# Patient Record
Sex: MALE | Race: WHITE | NOT HISPANIC OR LATINO | Employment: OTHER | URBAN - METROPOLITAN AREA
[De-identification: names, ages, dates, MRNs, and addresses within clinical notes are randomized per-mention and may not be internally consistent; named-entity substitution may affect disease eponyms.]

---

## 2019-09-16 ENCOUNTER — TELEPHONE (OUTPATIENT)
Dept: FAMILY MEDICINE | Facility: CLINIC | Age: 69
End: 2019-09-16

## 2019-09-16 NOTE — TELEPHONE ENCOUNTER
----- Message from Dottie Hatch sent at 9/16/2019 11:40 AM CDT -----  vm- pt left voicemail to call and I called back and no answer  [phone]

## 2019-12-03 ENCOUNTER — OFFICE VISIT (OUTPATIENT)
Dept: FAMILY MEDICINE | Facility: CLINIC | Age: 69
End: 2019-12-03

## 2019-12-03 VITALS
SYSTOLIC BLOOD PRESSURE: 110 MMHG | BODY MASS INDEX: 31.94 KG/M2 | DIASTOLIC BLOOD PRESSURE: 70 MMHG | HEIGHT: 72 IN | HEART RATE: 48 BPM | WEIGHT: 235.81 LBS

## 2019-12-03 DIAGNOSIS — I10 ESSENTIAL HYPERTENSION: ICD-10-CM

## 2019-12-03 DIAGNOSIS — K21.9 GASTROESOPHAGEAL REFLUX DISEASE, ESOPHAGITIS PRESENCE NOT SPECIFIED: ICD-10-CM

## 2019-12-03 DIAGNOSIS — E03.9 HYPOTHYROIDISM, UNSPECIFIED TYPE: Primary | ICD-10-CM

## 2019-12-03 DIAGNOSIS — E78.5 HYPERLIPIDEMIA, UNSPECIFIED HYPERLIPIDEMIA TYPE: ICD-10-CM

## 2019-12-03 DIAGNOSIS — M1A.9XX0 GOUT, CHRONIC: ICD-10-CM

## 2019-12-03 DIAGNOSIS — F41.9 ANXIETY DISORDER, UNSPECIFIED TYPE: ICD-10-CM

## 2019-12-03 PROCEDURE — 1126F PR PAIN SEVERITY QUANTIFIED, NO PAIN PRESENT: ICD-10-PCS | Mod: S$GLB,,, | Performed by: PHYSICIAN ASSISTANT

## 2019-12-03 PROCEDURE — 1159F MED LIST DOCD IN RCRD: CPT | Mod: S$GLB,,, | Performed by: PHYSICIAN ASSISTANT

## 2019-12-03 PROCEDURE — 1126F AMNT PAIN NOTED NONE PRSNT: CPT | Mod: S$GLB,,, | Performed by: PHYSICIAN ASSISTANT

## 2019-12-03 PROCEDURE — 99214 PR OFFICE/OUTPT VISIT, EST, LEVL IV, 30-39 MIN: ICD-10-PCS | Mod: S$GLB,,, | Performed by: PHYSICIAN ASSISTANT

## 2019-12-03 PROCEDURE — 99214 OFFICE O/P EST MOD 30 MIN: CPT | Mod: S$GLB,,, | Performed by: PHYSICIAN ASSISTANT

## 2019-12-03 PROCEDURE — 1159F PR MEDICATION LIST DOCUMENTED IN MEDICAL RECORD: ICD-10-PCS | Mod: S$GLB,,, | Performed by: PHYSICIAN ASSISTANT

## 2019-12-03 RX ORDER — NEBIVOLOL 10 MG/1
20 TABLET ORAL DAILY
Qty: 180 TABLET | Refills: 1 | Status: SHIPPED | OUTPATIENT
Start: 2019-12-03 | End: 2020-06-09 | Stop reason: SDUPTHER

## 2019-12-03 RX ORDER — ATORVASTATIN CALCIUM 20 MG/1
20 TABLET, FILM COATED ORAL DAILY
Qty: 90 TABLET | Refills: 1 | Status: SHIPPED | OUTPATIENT
Start: 2019-12-03 | End: 2020-06-09 | Stop reason: SDUPTHER

## 2019-12-03 RX ORDER — OMEPRAZOLE 20 MG/1
20 CAPSULE, DELAYED RELEASE ORAL DAILY
COMMUNITY
End: 2019-12-03 | Stop reason: SDUPTHER

## 2019-12-03 RX ORDER — AMLODIPINE BESYLATE 10 MG/1
10 TABLET ORAL DAILY
COMMUNITY
Start: 2016-09-13 | End: 2019-12-03 | Stop reason: SDUPTHER

## 2019-12-03 RX ORDER — TERAZOSIN 5 MG/1
5 CAPSULE ORAL NIGHTLY
Qty: 90 CAPSULE | Refills: 1 | Status: SHIPPED | OUTPATIENT
Start: 2019-12-03 | End: 2020-06-09 | Stop reason: SDUPTHER

## 2019-12-03 RX ORDER — OLMESARTAN MEDOXOMIL 40 MG/1
40 TABLET ORAL DAILY
COMMUNITY
End: 2019-12-03 | Stop reason: SDUPTHER

## 2019-12-03 RX ORDER — LEVOTHYROXINE SODIUM 50 UG/1
50 TABLET ORAL
Qty: 90 TABLET | Refills: 1 | Status: SHIPPED | OUTPATIENT
Start: 2019-12-03 | End: 2020-05-31

## 2019-12-03 RX ORDER — ALLOPURINOL 300 MG/1
300 TABLET ORAL DAILY
Qty: 90 TABLET | Refills: 1 | Status: SHIPPED | OUTPATIENT
Start: 2019-12-03 | End: 2020-05-15 | Stop reason: SDUPTHER

## 2019-12-03 RX ORDER — OMEPRAZOLE 20 MG/1
20 CAPSULE, DELAYED RELEASE ORAL DAILY
Qty: 90 CAPSULE | Refills: 1 | Status: SHIPPED | OUTPATIENT
Start: 2019-12-03 | End: 2020-06-09 | Stop reason: SDUPTHER

## 2019-12-03 RX ORDER — LEVOTHYROXINE SODIUM 50 UG/1
50 TABLET ORAL
COMMUNITY
Start: 2018-03-02 | End: 2019-12-03 | Stop reason: SDUPTHER

## 2019-12-03 RX ORDER — NEBIVOLOL 10 MG/1
20 TABLET ORAL DAILY
COMMUNITY
End: 2019-12-03 | Stop reason: SDUPTHER

## 2019-12-03 RX ORDER — LEVOTHYROXINE SODIUM 200 UG/1
200 TABLET ORAL DAILY
COMMUNITY
Start: 2017-03-15 | End: 2020-06-09 | Stop reason: SDUPTHER

## 2019-12-03 RX ORDER — OLMESARTAN MEDOXOMIL AND HYDROCHLOROTHIAZIDE 40/12.5 40; 12.5 MG/1; MG/1
40 TABLET ORAL DAILY
COMMUNITY
End: 2019-12-03

## 2019-12-03 RX ORDER — AMLODIPINE BESYLATE 10 MG/1
10 TABLET ORAL DAILY
Qty: 90 TABLET | Refills: 1 | Status: SHIPPED | OUTPATIENT
Start: 2019-12-03 | End: 2020-06-09 | Stop reason: SDUPTHER

## 2019-12-03 RX ORDER — OLMESARTAN MEDOXOMIL 40 MG/1
40 TABLET ORAL DAILY
Qty: 90 TABLET | Refills: 1 | Status: SHIPPED | OUTPATIENT
Start: 2019-12-03 | End: 2020-09-08 | Stop reason: SDUPTHER

## 2019-12-03 NOTE — PROGRESS NOTES
SUBJECTIVE:    Patient ID: Richy Hdz is a 68 y.o. male.    Chief Complaint: Follow-up (Pt presents today for 5 month follow up and medication refills.....mlr) and Medication Refill (Medication bottles present and visit and meds reconciled.....mlr)    Patient presents today for regular checkup.  He reports that he has been doing well overall.  He is managed for his blood pressure, cholesterol, thyroid, and gout. Planning wedding wedding for 10/10/20. He denies any chest pains or SOB at this time. Had all of his annual labs done earlier this year. Says that he has recently started weight loss regemin. Really watching his diet and portions.      No visits with results within 6 Month(s) from this visit.   Latest known visit with results is:   No results found for any previous visit.       Past Medical History:   Diagnosis Date    Back pain     Cancer     skin-ear    Gout attack     Hyperlipemia     Hypertension     Thyroid disease      Past Surgical History:   Procedure Laterality Date    JOINT REPLACEMENT      bilateral shoulder    kidney stones      SKIN CANCER EXCISION  2013    x 2 to ear.    TONSILLECTOMY      VASECTOMY       History reviewed. No pertinent family history.    Marital Status:   Alcohol History:  reports that he drinks about 7.0 standard drinks of alcohol per week.  Tobacco History:  reports that he has been smoking cigars. He has smoked for the past 0.00 years. He has never used smokeless tobacco.  Drug History:  has no drug history on file.    Review of patient's allergies indicates:  No Known Allergies    Current Outpatient Medications:     allopurinol (ZYLOPRIM) 300 MG tablet, Take 1 tablet (300 mg total) by mouth once daily., Disp: 90 tablet, Rfl: 1    amLODIPine (NORVASC) 10 MG tablet, Take 1 tablet (10 mg total) by mouth once daily., Disp: 90 tablet, Rfl: 1    atorvastatin (LIPITOR) 20 MG tablet, Take 1 tablet (20 mg total) by mouth once daily., Disp: 90  "tablet, Rfl: 1    levothyroxine (SYNTHROID) 200 MCG tablet, Take 200 mcg by mouth once daily., Disp: , Rfl:     levothyroxine (SYNTHROID) 50 MCG tablet, Take 1 tablet (50 mcg total) by mouth before breakfast., Disp: 90 tablet, Rfl: 1    mv with min-lycopene-lutein (CENTRUM SILVER) 0.4-300-250 mg-mcg-mcg Tab, Take 1 tablet by mouth once daily., Disp: , Rfl:     nebivolol (BYSTOLIC) 10 MG Tab, Take 2 tablets (20 mg total) by mouth once daily., Disp: 180 tablet, Rfl: 1    olmesartan (BENICAR) 40 MG tablet, Take 1 tablet (40 mg total) by mouth once daily., Disp: 90 tablet, Rfl: 1    omeprazole (PRILOSEC) 20 MG capsule, Take 1 capsule (20 mg total) by mouth once daily., Disp: 90 capsule, Rfl: 1    terazosin (HYTRIN) 5 MG capsule, Take 1 capsule (5 mg total) by mouth every evening., Disp: 90 capsule, Rfl: 1    Review of Systems   Constitutional: Negative for activity change, fatigue, fever and unexpected weight change.   HENT: Negative for congestion.    Respiratory: Negative for apnea, cough, chest tightness and shortness of breath.    Cardiovascular: Negative for chest pain and palpitations.   Gastrointestinal: Negative for abdominal distention and abdominal pain.   Genitourinary: Negative for difficulty urinating and dysuria.   Musculoskeletal: Negative for arthralgias and back pain.   Neurological: Negative for dizziness and weakness.          Objective:      Vitals:    12/03/19 1137   BP: 110/70   Pulse: (!) 48   Weight: 107 kg (235 lb 12.8 oz)   Height: 5' 11.5" (1.816 m)     Physical Exam   Constitutional: He is oriented to person, place, and time. He appears well-developed and well-nourished. No distress.   HENT:   Head: Normocephalic and atraumatic.   Eyes: Pupils are equal, round, and reactive to light.   Neck: Normal range of motion. Neck supple. No thyromegaly present.   Cardiovascular: Normal rate, regular rhythm, normal heart sounds and intact distal pulses.   Pulmonary/Chest: Effort normal and breath " sounds normal.   Abdominal: Soft. Bowel sounds are normal. He exhibits no distension. There is no tenderness.   Musculoskeletal: Normal range of motion.   Neurological: He is alert and oriented to person, place, and time. No cranial nerve deficit.   Skin: Skin is warm and dry. No rash noted. No erythema.         Assessment:       1. Hypothyroidism, unspecified type    2. Gout, chronic    3. Hyperlipidemia, unspecified hyperlipidemia type    4. Essential hypertension    5. Anxiety disorder, unspecified type    6. Gastroesophageal reflux disease, esophagitis presence not specified         Plan:       Hypothyroidism, unspecified type  Comments:  managed with 250mcg. continue as is.  Orders:  -     levothyroxine (SYNTHROID) 50 MCG tablet; Take 1 tablet (50 mcg total) by mouth before breakfast.  Dispense: 90 tablet; Refill: 1    Gout, chronic  Comments:  Doing well. no recent flares with the current dose of allopurinol  Orders:  -     allopurinol (ZYLOPRIM) 300 MG tablet; Take 1 tablet (300 mg total) by mouth once daily.  Dispense: 90 tablet; Refill: 1    Hyperlipidemia, unspecified hyperlipidemia type  Comments:  stable, continue as is. Really watching the diet and adding exercise.  Orders:  -     atorvastatin (LIPITOR) 20 MG tablet; Take 1 tablet (20 mg total) by mouth once daily.  Dispense: 90 tablet; Refill: 1    Essential hypertension  Comments:  beautiful pressure. Continue as is.  Orders:  -     amLODIPine (NORVASC) 10 MG tablet; Take 1 tablet (10 mg total) by mouth once daily.  Dispense: 90 tablet; Refill: 1  -     nebivolol (BYSTOLIC) 10 MG Tab; Take 2 tablets (20 mg total) by mouth once daily.  Dispense: 180 tablet; Refill: 1  -     olmesartan (BENICAR) 40 MG tablet; Take 1 tablet (40 mg total) by mouth once daily.  Dispense: 90 tablet; Refill: 1  -     terazosin (HYTRIN) 5 MG capsule; Take 1 capsule (5 mg total) by mouth every evening.  Dispense: 90 capsule; Refill: 1    Anxiety disorder, unspecified  type    Gastroesophageal reflux disease, esophagitis presence not specified  Comments:  well managed with prilosec. continue as is.  Orders:  -     omeprazole (PRILOSEC) 20 MG capsule; Take 1 capsule (20 mg total) by mouth once daily.  Dispense: 90 capsule; Refill: 1      Follow up in about 6 months (around 6/3/2020) for Annual Physical.        12/3/2019 Adolfo Mae PA-C

## 2019-12-03 NOTE — PATIENT INSTRUCTIONS
Controlling High Blood Pressure  High blood pressure (hypertension) is often called the silent killer. This is because many people who have it dont know it. High blood pressure is defined as 140/90 mm Hg or higher. Know your blood pressure and remember to check it regularly. Doing so can save your life. Here are some things you can do to help control your blood pressure.    Choose heart-healthy foods  · Select low-salt, low-fat foods. Limit sodium intake to 2,400 mg per day or the amount suggested by your healthcare provider.  · Limit canned, dried, cured, packaged, and fast foods. These can contain a lot of salt.  · Eat 8 to 10 servings of fruits and vegetables every day.  · Choose lean meats, fish, or chicken.  · Eat whole-grain pasta, brown rice, and beans.  · Eat 2 to 3 servings of low-fat or fat-free dairy products.  · Ask your doctor about the DASH eating plan. This plan helps reduce blood pressure.  · When you go to a restaurant, ask that your meal be prepared with no added salt.  Maintain a healthy weight  · Ask your healthcare provider how many calories to eat a day. Then stick to that number.  · Ask your healthcare provider what weight range is healthiest for you. If you are overweight, a weight loss of only 3% to 5% of your body weight can help lower blood pressure. Generally, a good weight loss goal is to lose 10% of your body weight in a year.  · Limit snacks and sweets.  · Get regular exercise.  Get up and get active  · Choose activities you enjoy. Find ones you can do with friends or family. This includes bicycling, dancing, walking, and jogging.  · Park farther away from building entrances.  · Use stairs instead of the elevator.  · When you can, walk or bike instead of driving.  · [place] leaves, garden, or do household repairs.  · Be active at a moderate to vigorous level of physical activity for at least 40 minutes for a minimum of 3 to 4 days a week.   Manage stress  · Make time to relax and enjoy  life. Find time to laugh.  · Communicate your concerns with your loved ones and your healthcare provider.  · Visit with family and friends, and keep up with hobbies.  Limit alcohol and quit smoking  · Men should have no more than 2 drinks per day.  · Women should have no more than 1 drink per day.  · Talk with your healthcare provider about quitting smoking. Smoking significantly increases your risk for heart disease and stroke. Ask your healthcare provider about community smoking cessation programs and other options.  Medicines  If lifestyle changes arent enough, your healthcare provider may prescribe high blood pressure medicine. Take all medicines as prescribed. If you have any questions about your medicines, ask your healthcare provider before stopping or changing them.   Date Last Reviewed: 4/27/2016  © 5486-0974 [employer]. [address] [place] [address], [place] [address]. All rights reserved. This information is not intended as a substitute for professional medical care. Always follow your healthcare professional's instructions.

## 2020-05-15 DIAGNOSIS — M1A.9XX0 GOUT, CHRONIC: ICD-10-CM

## 2020-05-15 RX ORDER — ALLOPURINOL 300 MG/1
300 TABLET ORAL DAILY
Qty: 90 TABLET | Refills: 1 | Status: SHIPPED | OUTPATIENT
Start: 2020-05-15 | End: 2020-06-09 | Stop reason: SDUPTHER

## 2020-05-15 NOTE — TELEPHONE ENCOUNTER
----- Message from Fely Lakhani sent at 5/15/2020 11:28 AM CDT -----  [facility] rep called on behalf of the pt for refill on Allopurinol tablet to [facility] mail order

## 2020-06-09 ENCOUNTER — OFFICE VISIT (OUTPATIENT)
Dept: FAMILY MEDICINE | Facility: CLINIC | Age: 70
End: 2020-06-09
Payer: COMMERCIAL

## 2020-06-09 VITALS
SYSTOLIC BLOOD PRESSURE: 180 MMHG | WEIGHT: 225 LBS | HEIGHT: 72 IN | TEMPERATURE: 99 F | HEART RATE: 56 BPM | BODY MASS INDEX: 30.48 KG/M2 | DIASTOLIC BLOOD PRESSURE: 88 MMHG

## 2020-06-09 DIAGNOSIS — I10 ESSENTIAL HYPERTENSION: ICD-10-CM

## 2020-06-09 DIAGNOSIS — Z79.899 ENCOUNTER FOR LONG-TERM (CURRENT) USE OF OTHER MEDICATIONS: ICD-10-CM

## 2020-06-09 DIAGNOSIS — M1A.9XX0 GOUT, CHRONIC: ICD-10-CM

## 2020-06-09 DIAGNOSIS — E78.5 HYPERLIPIDEMIA, UNSPECIFIED HYPERLIPIDEMIA TYPE: ICD-10-CM

## 2020-06-09 DIAGNOSIS — E03.9 HYPOTHYROIDISM, UNSPECIFIED TYPE: Primary | ICD-10-CM

## 2020-06-09 DIAGNOSIS — K21.9 GASTROESOPHAGEAL REFLUX DISEASE, ESOPHAGITIS PRESENCE NOT SPECIFIED: ICD-10-CM

## 2020-06-09 PROCEDURE — 99214 OFFICE O/P EST MOD 30 MIN: CPT | Mod: S$GLB,,, | Performed by: PHYSICIAN ASSISTANT

## 2020-06-09 PROCEDURE — 99214 PR OFFICE/OUTPT VISIT, EST, LEVL IV, 30-39 MIN: ICD-10-PCS | Mod: S$GLB,,, | Performed by: PHYSICIAN ASSISTANT

## 2020-06-09 RX ORDER — NEBIVOLOL 10 MG/1
20 TABLET ORAL DAILY
Qty: 180 TABLET | Refills: 1 | Status: SHIPPED | OUTPATIENT
Start: 2020-06-09 | End: 2020-09-08 | Stop reason: SDUPTHER

## 2020-06-09 RX ORDER — LEVOTHYROXINE SODIUM 50 UG/1
50 TABLET ORAL
Qty: 90 TABLET | Refills: 1 | Status: SHIPPED | OUTPATIENT
Start: 2020-06-09 | End: 2020-09-08 | Stop reason: SDUPTHER

## 2020-06-09 RX ORDER — LEVOTHYROXINE SODIUM 200 UG/1
200 TABLET ORAL DAILY
Qty: 90 TABLET | Refills: 1 | Status: SHIPPED | OUTPATIENT
Start: 2020-06-09 | End: 2020-09-08 | Stop reason: SDUPTHER

## 2020-06-09 RX ORDER — OMEPRAZOLE 20 MG/1
20 CAPSULE, DELAYED RELEASE ORAL DAILY
Qty: 90 CAPSULE | Refills: 1 | Status: SHIPPED | OUTPATIENT
Start: 2020-06-09 | End: 2020-09-08 | Stop reason: SDUPTHER

## 2020-06-09 RX ORDER — ALLOPURINOL 300 MG/1
300 TABLET ORAL DAILY
Qty: 90 TABLET | Refills: 1 | Status: SHIPPED | OUTPATIENT
Start: 2020-06-09 | End: 2020-06-26 | Stop reason: SDUPTHER

## 2020-06-09 RX ORDER — LEVOTHYROXINE SODIUM 50 UG/1
50 TABLET ORAL
COMMUNITY
End: 2020-06-09 | Stop reason: SDUPTHER

## 2020-06-09 RX ORDER — ATORVASTATIN CALCIUM 20 MG/1
20 TABLET, FILM COATED ORAL DAILY
Qty: 90 TABLET | Refills: 1 | Status: SHIPPED | OUTPATIENT
Start: 2020-06-09 | End: 2020-09-08 | Stop reason: SDUPTHER

## 2020-06-09 RX ORDER — AMLODIPINE BESYLATE 10 MG/1
10 TABLET ORAL DAILY
Qty: 90 TABLET | Refills: 1 | Status: SHIPPED | OUTPATIENT
Start: 2020-06-09 | End: 2020-09-08 | Stop reason: SDUPTHER

## 2020-06-09 RX ORDER — TERAZOSIN 5 MG/1
5 CAPSULE ORAL NIGHTLY
Qty: 90 CAPSULE | Refills: 1 | Status: SHIPPED | OUTPATIENT
Start: 2020-06-09 | End: 2020-09-08 | Stop reason: SDUPTHER

## 2020-06-09 NOTE — PATIENT INSTRUCTIONS
Uncontrolled High Blood Pressure (Established)    Your blood pressure was unusually high today. This can occur if youve missed doses of your blood pressure medicine. Or it can happen if you are taking other medicines. These include some asthma inhalers, decongestants, diet pills, and street drugs like cocaine and amphetamine.  Other causes include:  · Weight gain  · More salt in your diet  · Smoking  · Caffeine  Your blood pressure can also rise if you are emotionally upset or in intense pain. It may go back to normal after a period of rest.  A blood pressure reading is made up of 2 numbers. There is a top number over a bottom number. The top number is the systolic pressure. The bottom number is the diastolic pressure. A normal blood pressure is a systolic pressure of less than 120 over a diastolic pressure of less than 80. High blood pressure (hypertension) is when the top number is 140 or higher. Or it is when the bottom number is 90 or higher. You will see your blood pressure readings written together. For example, a person with a systolic pressure of 118 and a diastolic pressure of 78 will have 118/78 written in the medical record. To be high blood pressure, the numbers must be higher when tested over a period of time. The blood pressures between normal and hypertension are called prehypertension. Prehypertension is a warning sign. The information gives you a chance to make lifestyle changes (weight loss, more exercise) that can keep your blood pressure from going higher.  Home care  Its important to take steps to lower your blood pressure. If you are taking blood pressure medicine, the guidelines below may help you need less or no medicines in the future.  · Begin a weight-loss program if you are overweight.  · Cut back on the amount of salt in your diet:  ¨ Avoid high-salt foods like olives, pickles, smoked meats, and salted potato chips.  ¨ Dont add salt to your food at the table.  ¨ Use only small  amounts of salt when cooking.  · Begin an exercise program. Talk with your health care provider about what exercise program is best for you. It doesnt have to be difficult. Even brisk walking for 20 minutes 3 times a week is a good form of exercise.  · Avoid medicines that stimulates the heart. This includes many over-the-counter cold and sinus decongestant pills and sprays, as well as diet pills. Check the warnings about hypertension on the label. Before purchasing any over-the-counter medicines or supplements, always ask the pharmacist about the product's potential interaction with your high blood pressure and your medicines.  · Stimulants such as amphetamine or cocaine could be lethal for someone with hypertension. Never take these.  · Limit how much caffeine you drink. Or switch to noncaffeinated beverages.  · Stop smoking. If you are a long-time smoker, this can be hard. Enroll in a stop-smoking program to make it more likely that you will succeed. Talk with your provider about ways to quit.  · Learn how to handle stress better. This is an important part of any program to lower blood pressure. Learn ways to relax. These include meditation, yoga, and biofeedback.  · If medicines were prescribed, take them exactly as directed. Missing doses may cause your blood pressure to get out of control.  · If you miss a dose or doses of your medicines, check with your healthcare provider or pharmacist about what to do.  · Consider buying an automatic blood pressure machine. Your provider may recommend a certain type. You can get one of these at most pharmacies. Measure your blood pressure twice a day, in the morning, and in the late afternoon. Keep a written record of your home blood pressure readings and take the record to your medical appointments.  Here are some additional guidelines on home blood pressure monitoring from the American [employer] Association.  · Don't smoke or drink coffee for 30 minutes  · Go to the bathroom  before the test.  · Relax for 5 minutes before taking the measurement.  · Sit correctly. Be sure your back is supported. Don't sit on a couch or soft chair. Uncross your feet and place them flat on the floor. Place your arm on a solid, flat surface like a table with the upper arm at heart level. Make certain the middle of the cuff is directly above the eye of the elbow. Check the monitor's instruction manual for an illustration.  · Take multiple readings. When you measure, take 2 or 3 readings one minute apart and record all of the results.  · Take your blood pressure at the same time every day, or as your healthcare provider recommends.  · Record the date, time, and blood pressure reading.  · Take the record with you to your next appointment. If your blood pressure monitor has a built-in memory, simply take the monitor with you to your next appointment.  · Call your provider if you have several high readings. Don't be frightened by a single high reading, but if you get several high readings, check in with your healthcare provider.  · Note: When blood pressure reaches a systolic (top number) of 180 or higher or a diastolic (bottom number) of 110 or higher, emergency medical treatment is required. Call your healthcare provider immediately.  Follow-up care  Regular visits to your own healthcare provider for blood pressure and medicine checks are an important part of your care. Make a follow-up appointment as directed. Bring the record of your home blood pressure readings to the appointment.  When to seek medical advice  Call your healthcare provider right away if any of these occur:  · Blood pressure reaches a systolic (top number) of 180 or higher or diastolic (bottom number) of 110 or higher, emergency medical treatment is required.  · Chest, arm, shoulder, neck, or upper back pain  · Shortness of breath  · Severe headache  · Throbbing or rushing sound in the ears  · Nosebleed  · Extreme drowsiness, confusion, or  fainting  · Dizziness or dizziness with spinning sensation (vertigo)  · Weakness in an arm or leg or on one side of the face  · Trouble speaking or seeing   Date Last Reviewed: 1/1/2017  © 6355-1998 [employer]. [address] [place] [address], [place] [address]. All rights reserved. This information is not intended as a substitute for professional medical care. Always follow your healthcare professional's instructions.

## 2020-06-09 NOTE — PROGRESS NOTES
"  SUBJECTIVE:    Patient ID: Richy Hdz is a 69 y.o. male.    Chief Complaint: Medication Refill (brought bottles// SW)    69-year-old white male presents today for regular checkup.  It does appear he is down about 10 lb since his last visit.  He had reported at that time that he was planning to diet and exercise more frequently in anticipation of his October wedding.  His blood pressure does appear to be much higher today.  He is also treated for thyroid, GERD, gout and hyperlipidemia. He is due for annual labs. He reports that he has been "very hit or miss" with medication lately. Ran out of some meds and hasnt been on at least 2 of his BP meds the past few weeks.      No visits with results within 6 Month(s) from this visit.   Latest known visit with results is:   No results found for any previous visit.       Past Medical History:   Diagnosis Date    Back pain     Cancer     skin-ear    Gout attack     Hyperlipemia     Hypertension     Thyroid disease      Past Surgical History:   Procedure Laterality Date    JOINT REPLACEMENT      bilateral shoulder    kidney stones      SKIN CANCER EXCISION  2013    x 2 to ear.    TONSILLECTOMY      VASECTOMY       History reviewed. No pertinent family history.    Marital Status:   Alcohol History:  reports that he drinks about 7.0 standard drinks of alcohol per week.  Tobacco History:  reports that he has been smoking cigars. He has smoked for the past 0.00 years. He has never used smokeless tobacco.  Drug History:  has no drug history on file.    Review of patient's allergies indicates:  No Known Allergies    Current Outpatient Medications:     allopurinoL (ZYLOPRIM) 300 MG tablet, Take 1 tablet (300 mg total) by mouth once daily., Disp: 90 tablet, Rfl: 1    amLODIPine (NORVASC) 10 MG tablet, Take 1 tablet (10 mg total) by mouth once daily., Disp: 90 tablet, Rfl: 1    atorvastatin (LIPITOR) 20 MG tablet, Take 1 tablet (20 mg total) by mouth once " "daily., Disp: 90 tablet, Rfl: 1    iron,carbonyl/ascorbic acid (VITRON-C ORAL), Take by mouth., Disp: , Rfl:     levothyroxine (SYNTHROID) 200 MCG tablet, Take 1 tablet (200 mcg total) by mouth once daily., Disp: 90 tablet, Rfl: 1    levothyroxine (SYNTHROID) 50 MCG tablet, Take 1 tablet (50 mcg total) by mouth before breakfast., Disp: 90 tablet, Rfl: 1    mv with min-lycopene-lutein (CENTRUM SILVER) 0.4-300-250 mg-mcg-mcg Tab, Take 1 tablet by mouth once daily., Disp: , Rfl:     nebivoloL (BYSTOLIC) 10 MG Tab, Take 2 tablets (20 mg total) by mouth once daily., Disp: 180 tablet, Rfl: 1    omeprazole (PRILOSEC) 20 MG capsule, Take 1 capsule (20 mg total) by mouth once daily., Disp: 90 capsule, Rfl: 1    terazosin (HYTRIN) 5 MG capsule, Take 1 capsule (5 mg total) by mouth every evening., Disp: 90 capsule, Rfl: 1    olmesartan (BENICAR) 40 MG tablet, Take 1 tablet (40 mg total) by mouth once daily., Disp: 90 tablet, Rfl: 1    Review of Systems   Constitutional: Negative for activity change, fatigue, fever and unexpected weight change.   HENT: Negative for congestion.    Respiratory: Negative for apnea, cough, chest tightness and shortness of breath.    Cardiovascular: Negative for chest pain and palpitations.   Gastrointestinal: Negative for abdominal distention and abdominal pain.   Genitourinary: Negative for difficulty urinating and dysuria.   Musculoskeletal: Negative for arthralgias and back pain.   Neurological: Negative for dizziness and weakness.          Objective:      Vitals:    06/09/20 1033 06/09/20 1035   BP: (!) 170/80 (!) 180/88   Pulse: (!) 56    Temp: 99.2 °F (37.3 °C)    Weight: 102.1 kg (225 lb)    Height: 5' 11.5" (1.816 m)      Physical Exam   Constitutional: He is oriented to person, place, and time. He appears well-developed and well-nourished. No distress.   HENT:   Head: Normocephalic and atraumatic.   Eyes: Pupils are equal, round, and reactive to light.   Neck: Normal range of " motion. Neck supple. No thyromegaly present.   Cardiovascular: Normal rate, regular rhythm, normal heart sounds and intact distal pulses.   Pulmonary/Chest: Effort normal and breath sounds normal.   Abdominal: Soft. Bowel sounds are normal. He exhibits no distension. There is no tenderness.   Musculoskeletal: Normal range of motion.   Neurological: He is alert and oriented to person, place, and time. No cranial nerve deficit.   Skin: Skin is warm and dry. No rash noted. No erythema.         Assessment:       1. Hypothyroidism, unspecified type    2. Gout, chronic    3. Essential hypertension    4. Hyperlipidemia, unspecified hyperlipidemia type    5. Gastroesophageal reflux disease, esophagitis presence not specified    6. Encounter for long-term (current) use of other medications         Plan:       Hypothyroidism, unspecified type  -     levothyroxine (SYNTHROID) 200 MCG tablet; Take 1 tablet (200 mcg total) by mouth once daily.  Dispense: 90 tablet; Refill: 1  -     levothyroxine (SYNTHROID) 50 MCG tablet; Take 1 tablet (50 mcg total) by mouth before breakfast.  Dispense: 90 tablet; Refill: 1    Gout, chronic  Comments:  Doing well. no recent flares with the current dose of allopurinol  Orders:  -     allopurinoL (ZYLOPRIM) 300 MG tablet; Take 1 tablet (300 mg total) by mouth once daily.  Dispense: 90 tablet; Refill: 1    Essential hypertension  Comments:  pressure elevated today likely 2/2 being off medication. Will refill everything and he will come back for NV in 2 weeks to check pressure  Orders:  -     amLODIPine (NORVASC) 10 MG tablet; Take 1 tablet (10 mg total) by mouth once daily.  Dispense: 90 tablet; Refill: 1  -     nebivoloL (BYSTOLIC) 10 MG Tab; Take 2 tablets (20 mg total) by mouth once daily.  Dispense: 180 tablet; Refill: 1  -     terazosin (HYTRIN) 5 MG capsule; Take 1 capsule (5 mg total) by mouth every evening.  Dispense: 90 capsule; Refill: 1    Hyperlipidemia, unspecified hyperlipidemia  type  Comments:  stable, continue as is. Really watching the diet and adding exercise.  Orders:  -     atorvastatin (LIPITOR) 20 MG tablet; Take 1 tablet (20 mg total) by mouth once daily.  Dispense: 90 tablet; Refill: 1    Gastroesophageal reflux disease, esophagitis presence not specified  Comments:  well managed with prilosec. continue as is.  Orders:  -     omeprazole (PRILOSEC) 20 MG capsule; Take 1 capsule (20 mg total) by mouth once daily.  Dispense: 90 capsule; Refill: 1    Encounter for long-term (current) use of other medications  -     CBC auto differential; Future; Expected date: 06/09/2020  -     Comprehensive metabolic panel; Future; Expected date: 06/09/2020  -     Lipid Panel; Future; Expected date: 06/09/2020  -     TSH w/reflex to FT4; Future; Expected date: 06/09/2020  -     Urinalysis, Reflex to Urine Culture Urine, Clean Catch; Future; Expected date: 06/09/2020  -     PSA, Screening; Future; Expected date: 06/09/2020  -     Microalbumin/creatinine urine ratio; Future; Expected date: 06/09/2020      Follow up in about 3 months (around 9/9/2020) for BP Check-Up, Nurse visit in 2 weeks.        6/9/2020 Adolfo Mae PA-C

## 2020-06-12 LAB
ALBUMIN SERPL-MCNC: 4.3 G/DL (ref 3.6–5.1)
ALBUMIN/CREAT UR: 22 MCG/MG CREAT
ALBUMIN/GLOB SERPL: 1.9 (CALC) (ref 1–2.5)
ALP SERPL-CCNC: 87 U/L (ref 35–144)
ALT SERPL-CCNC: 30 U/L (ref 9–46)
APPEARANCE UR: CLEAR
AST SERPL-CCNC: 29 U/L (ref 10–35)
BACTERIA #/AREA URNS HPF: ABNORMAL /HPF
BACTERIA UR CULT: ABNORMAL
BASOPHILS # BLD AUTO: 93 CELLS/UL (ref 0–200)
BASOPHILS NFR BLD AUTO: 1.5 %
BILIRUB SERPL-MCNC: 0.6 MG/DL (ref 0.2–1.2)
BILIRUB UR QL STRIP: NEGATIVE
BUN SERPL-MCNC: 19 MG/DL (ref 7–25)
BUN/CREAT SERPL: ABNORMAL (CALC) (ref 6–22)
CALCIUM SERPL-MCNC: 9.5 MG/DL (ref 8.6–10.3)
CHLORIDE SERPL-SCNC: 102 MMOL/L (ref 98–110)
CHOLEST SERPL-MCNC: 182 MG/DL
CHOLEST/HDLC SERPL: 3.5 (CALC)
CO2 SERPL-SCNC: 28 MMOL/L (ref 20–32)
COLOR UR: ABNORMAL
CREAT SERPL-MCNC: 1.09 MG/DL (ref 0.7–1.18)
CREAT UR-MCNC: 453 MG/DL (ref 20–320)
EOSINOPHIL # BLD AUTO: 198 CELLS/UL (ref 15–500)
EOSINOPHIL NFR BLD AUTO: 3.2 %
ERYTHROCYTE [DISTWIDTH] IN BLOOD BY AUTOMATED COUNT: 13 % (ref 11–15)
GFRSERPLBLD MDRD-ARVRAT: 68 ML/MIN/1.73M2
GLOBULIN SER CALC-MCNC: 2.3 G/DL (CALC) (ref 1.9–3.7)
GLUCOSE SERPL-MCNC: 124 MG/DL (ref 65–99)
GLUCOSE UR QL STRIP: NEGATIVE
HCT VFR BLD AUTO: 47.7 % (ref 38.5–50)
HDLC SERPL-MCNC: 52 MG/DL
HGB BLD-MCNC: 15.4 G/DL (ref 13.2–17.1)
HGB UR QL STRIP: NEGATIVE
HYALINE CASTS #/AREA URNS LPF: ABNORMAL /LPF
KETONES UR QL STRIP: ABNORMAL
LDLC SERPL CALC-MCNC: 100 MG/DL (CALC)
LEUKOCYTE ESTERASE UR QL STRIP: NEGATIVE
LYMPHOCYTES # BLD AUTO: 1767 CELLS/UL (ref 850–3900)
LYMPHOCYTES NFR BLD AUTO: 28.5 %
MCH RBC QN AUTO: 29.5 PG (ref 27–33)
MCHC RBC AUTO-ENTMCNC: 32.3 G/DL (ref 32–36)
MCV RBC AUTO: 91.4 FL (ref 80–100)
MICROALBUMIN UR-MCNC: 10 MG/DL
MONOCYTES # BLD AUTO: 490 CELLS/UL (ref 200–950)
MONOCYTES NFR BLD AUTO: 7.9 %
NEUTROPHILS # BLD AUTO: 3652 CELLS/UL (ref 1500–7800)
NEUTROPHILS NFR BLD AUTO: 58.9 %
NITRITE UR QL STRIP: NEGATIVE
NONHDLC SERPL-MCNC: 130 MG/DL (CALC)
PH UR STRIP: ABNORMAL [PH] (ref 5–8)
PLATELET # BLD AUTO: 165 THOUSAND/UL (ref 140–400)
PMV BLD REES-ECKER: 12.9 FL (ref 7.5–12.5)
POTASSIUM SERPL-SCNC: 4.7 MMOL/L (ref 3.5–5.3)
PROT SERPL-MCNC: 6.6 G/DL (ref 6.1–8.1)
PROT UR QL STRIP: ABNORMAL
PSA SERPL-MCNC: 1.8 NG/ML
RBC # BLD AUTO: 5.22 MILLION/UL (ref 4.2–5.8)
RBC #/AREA URNS HPF: ABNORMAL /HPF
SODIUM SERPL-SCNC: 139 MMOL/L (ref 135–146)
SP GR UR STRIP: 1.03 (ref 1–1.03)
SQUAMOUS #/AREA URNS HPF: ABNORMAL /HPF
T4 FREE SERPL-MCNC: 0.8 NG/DL (ref 0.8–1.8)
TRIGL SERPL-MCNC: 187 MG/DL
TSH SERPL-ACNC: 36.3 MIU/L (ref 0.4–4.5)
WBC # BLD AUTO: 6.2 THOUSAND/UL (ref 3.8–10.8)
WBC #/AREA URNS HPF: ABNORMAL /HPF

## 2020-06-17 ENCOUNTER — TELEPHONE (OUTPATIENT)
Dept: FAMILY MEDICINE | Facility: CLINIC | Age: 70
End: 2020-06-17

## 2020-06-17 NOTE — TELEPHONE ENCOUNTER
----- Message from Adolfo Mae PA-C sent at 6/14/2020  6:27 PM CDT -----  Labs are definitely indicative that you have not been taking your medication daily. This is especially true with your thyroid. Please make sure that you are getting each of these meds daily! I would like to recheck the TSH in 4 weeks. If still elevated we will have to go up on your dose.

## 2020-06-18 NOTE — PROGRESS NOTES
Spoke to patient with results verbatim per Nathen. Verbalized understanding on all. Remind me for lab.

## 2020-06-26 DIAGNOSIS — M1A.9XX0 GOUT, CHRONIC: ICD-10-CM

## 2020-06-26 NOTE — TELEPHONE ENCOUNTER
----- Message from Shelbie Ann sent at 6/26/2020 11:51 AM CDT -----  Regarding: Needs refill  Contact: Richy Hdz  #2 different Pharmacy ##   Pt needs refill on his Allopurinol he is completely out so he needs 30 days sent locally   Send to [facility] on [address]   Send the 60 day supply over to [facility] please.   Pt# [phone]

## 2020-06-29 RX ORDER — ALLOPURINOL 300 MG/1
300 TABLET ORAL DAILY
Qty: 90 TABLET | Refills: 1 | Status: SHIPPED | OUTPATIENT
Start: 2020-06-29 | End: 2020-09-08 | Stop reason: SDUPTHER

## 2020-06-29 RX ORDER — ALLOPURINOL 300 MG/1
300 TABLET ORAL DAILY
Qty: 30 TABLET | Refills: 0 | Status: SHIPPED | OUTPATIENT
Start: 2020-06-29 | End: 2020-07-29

## 2020-07-09 ENCOUNTER — TELEPHONE (OUTPATIENT)
Dept: FAMILY MEDICINE | Facility: CLINIC | Age: 70
End: 2020-07-09

## 2020-07-09 DIAGNOSIS — Z79.899 ENCOUNTER FOR LONG-TERM (CURRENT) USE OF OTHER MEDICATIONS: ICD-10-CM

## 2020-07-09 DIAGNOSIS — I10 ESSENTIAL HYPERTENSION: ICD-10-CM

## 2020-07-09 DIAGNOSIS — E03.9 HYPOTHYROIDISM, UNSPECIFIED TYPE: Primary | ICD-10-CM

## 2020-07-09 NOTE — TELEPHONE ENCOUNTER
----- Message from [facility], RT sent at 6/17/2020 12:35 PM CDT -----  Regarding: TSH due next week   Adolfo Mae PA-C   6/14/2020  6:27 PM    Labs are definitely indicative that you have not been taking your medication daily. This is especially true with your thyroid. Please make sure that you are getting each of these meds daily! I would like to recheck the TSH in 4 weeks. If still elevated we will have to go up on your dose.

## 2020-07-14 ENCOUNTER — TELEPHONE (OUTPATIENT)
Dept: FAMILY MEDICINE | Facility: CLINIC | Age: 70
End: 2020-07-14

## 2020-07-14 NOTE — TELEPHONE ENCOUNTER
----- Message from RT Mohamud sent at 6/18/2020  9:18 AM CDT -----  Regarding: Lab due this week  ----- Message from Adolfo Mae PA-C sent at 6/14/2020  6:27 PM CDT -----  Labs are definitely indicative that you have not been taking your medication daily. This is especially true with your thyroid. Please make sure that you are getting each of these meds daily! I would like to recheck the TSH in 4 weeks. If still elevated we will have to go up on your dose.

## 2020-07-15 LAB — TSH SERPL-ACNC: 9.65 MIU/L (ref 0.4–4.5)

## 2020-07-20 ENCOUNTER — TELEPHONE (OUTPATIENT)
Dept: FAMILY MEDICINE | Facility: CLINIC | Age: 70
End: 2020-07-20

## 2020-07-20 NOTE — TELEPHONE ENCOUNTER
Spoke to patient who states that he did run out of the .05 dose for 4-10 days about a week or so before the test was taken.  He was on both pills again for 4-5 days when tested.  He is concerned about the increased cost of being on the name brand and would like to retest in a month after being on both pills daily.  Does this work for you?/ba

## 2020-07-20 NOTE — TELEPHONE ENCOUNTER
----- Message from Adolfo Mae PA-C sent at 7/19/2020 11:25 AM CDT -----  Pt is on extremely high dose of synthroid and TSH still high. Need to confirm that patient is indeed taking this medication on empty stomach 1st thing in morning EVERYDAY. If so we need to switch to brand name only synthroid and recheck TSH in 3 mos. After that if still high I would suggest endo eval.

## 2020-08-28 ENCOUNTER — TELEPHONE (OUTPATIENT)
Dept: FAMILY MEDICINE | Facility: CLINIC | Age: 70
End: 2020-08-28

## 2020-08-28 NOTE — TELEPHONE ENCOUNTER
Spoke to pt regarding his appt on 9/8. Offered virtual visit pt stated he wants to keep his in office appt. c-19 reviewed. Pt wanted to know if he needed to have any labs before his visit.    Please advise

## 2020-09-08 ENCOUNTER — OFFICE VISIT (OUTPATIENT)
Dept: FAMILY MEDICINE | Facility: CLINIC | Age: 70
End: 2020-09-08
Payer: MEDICARE

## 2020-09-08 VITALS
HEART RATE: 52 BPM | DIASTOLIC BLOOD PRESSURE: 72 MMHG | BODY MASS INDEX: 31.02 KG/M2 | WEIGHT: 229 LBS | SYSTOLIC BLOOD PRESSURE: 160 MMHG | TEMPERATURE: 99 F | HEIGHT: 72 IN

## 2020-09-08 DIAGNOSIS — E78.5 HYPERLIPIDEMIA, UNSPECIFIED HYPERLIPIDEMIA TYPE: ICD-10-CM

## 2020-09-08 DIAGNOSIS — I10 ESSENTIAL HYPERTENSION: ICD-10-CM

## 2020-09-08 DIAGNOSIS — M1A.9XX0 GOUT, CHRONIC: ICD-10-CM

## 2020-09-08 DIAGNOSIS — K21.9 GASTROESOPHAGEAL REFLUX DISEASE, ESOPHAGITIS PRESENCE NOT SPECIFIED: ICD-10-CM

## 2020-09-08 DIAGNOSIS — E03.9 HYPOTHYROIDISM, UNSPECIFIED TYPE: ICD-10-CM

## 2020-09-08 PROCEDURE — 99213 PR OFFICE/OUTPT VISIT, EST, LEVL III, 20-29 MIN: ICD-10-PCS | Mod: S$GLB,,, | Performed by: PHYSICIAN ASSISTANT

## 2020-09-08 PROCEDURE — 99213 OFFICE O/P EST LOW 20 MIN: CPT | Mod: S$GLB,,, | Performed by: PHYSICIAN ASSISTANT

## 2020-09-08 RX ORDER — TERAZOSIN 5 MG/1
5 CAPSULE ORAL NIGHTLY
Qty: 90 CAPSULE | Refills: 1 | Status: SHIPPED | OUTPATIENT
Start: 2020-09-08 | End: 2021-08-16 | Stop reason: SDUPTHER

## 2020-09-08 RX ORDER — HYDROCHLOROTHIAZIDE 25 MG/1
25 TABLET ORAL DAILY
Qty: 30 TABLET | Refills: 2 | Status: SHIPPED | OUTPATIENT
Start: 2020-09-08 | End: 2020-10-06 | Stop reason: SDUPTHER

## 2020-09-08 RX ORDER — OMEPRAZOLE 20 MG/1
20 CAPSULE, DELAYED RELEASE ORAL DAILY
Qty: 90 CAPSULE | Refills: 1 | Status: SHIPPED | OUTPATIENT
Start: 2020-09-08 | End: 2021-08-16 | Stop reason: SDUPTHER

## 2020-09-08 RX ORDER — LEVOTHYROXINE SODIUM 200 UG/1
200 TABLET ORAL DAILY
Qty: 90 TABLET | Refills: 1 | Status: SHIPPED | OUTPATIENT
Start: 2020-09-08 | End: 2021-03-22 | Stop reason: SDUPTHER

## 2020-09-08 RX ORDER — OLMESARTAN MEDOXOMIL 40 MG/1
40 TABLET ORAL DAILY
Qty: 90 TABLET | Refills: 1 | Status: SHIPPED | OUTPATIENT
Start: 2020-09-08 | End: 2021-03-22 | Stop reason: SDUPTHER

## 2020-09-08 RX ORDER — ALLOPURINOL 300 MG/1
300 TABLET ORAL DAILY
Qty: 90 TABLET | Refills: 1 | Status: SHIPPED | OUTPATIENT
Start: 2020-09-08 | End: 2021-03-07

## 2020-09-08 RX ORDER — LEVOTHYROXINE SODIUM 50 UG/1
50 TABLET ORAL
Qty: 90 TABLET | Refills: 1 | Status: SHIPPED | OUTPATIENT
Start: 2020-09-08 | End: 2021-03-22 | Stop reason: SDUPTHER

## 2020-09-08 RX ORDER — ATORVASTATIN CALCIUM 20 MG/1
20 TABLET, FILM COATED ORAL DAILY
Qty: 90 TABLET | Refills: 1 | Status: SHIPPED | OUTPATIENT
Start: 2020-09-08 | End: 2021-08-16 | Stop reason: SDUPTHER

## 2020-09-08 RX ORDER — AMLODIPINE BESYLATE 10 MG/1
10 TABLET ORAL DAILY
Qty: 90 TABLET | Refills: 1 | Status: SHIPPED | OUTPATIENT
Start: 2020-09-08 | End: 2021-05-26 | Stop reason: SDUPTHER

## 2020-09-08 RX ORDER — NEBIVOLOL 10 MG/1
20 TABLET ORAL DAILY
Qty: 180 TABLET | Refills: 1 | Status: SHIPPED | OUTPATIENT
Start: 2020-09-08 | End: 2021-07-07 | Stop reason: SDUPTHER

## 2020-09-08 NOTE — PATIENT INSTRUCTIONS
Established High Blood Pressure    High blood pressure (hypertension) is a chronic disease. Often, healthcare providers dont know what causes it. But it can be caused by certain health conditions and medicines.  If you have high blood pressure, you may not have any symptoms. If you do have symptoms, they may include headache, dizziness, changes in your vision, chest pain, and shortness of breath. But even without symptoms, high blood pressure thats not treated raises your risk for heart attack and stroke. High blood pressure is a serious health risk and shouldnt be ignored.  A blood pressure reading is made up of two numbers: a higher number over a lower number. The top number is the systolic pressure. The bottom number is the diastolic pressure. A normal blood pressure is a systolic pressure of  less than 120 over a diastolic pressure of less than 80. You will see your blood pressure readings written together. For example, a person with a systolic pressure of 188 and a diastolic pressure of 78 will have 118/78 written in the medical record.  High blood pressure is when either the top number is 140 or higher, or the bottom number is 90 or higher. This must be the result when taking your blood pressure a number of times. The blood pressures between normal and high are called prehypertension.  Home care  If you have high blood pressure, you should do what is listed below to lower your blood pressure. If you are taking medicines for high blood pressure, these methods may reduce or end your need for medicines in the future.  · Begin a weight-loss program if you are overweight.  · Cut back on how much salt you get in your diet. Heres how to do this:  ¨ Dont eat foods that have a lot of salt. These include olives, pickles, smoked meats, and salted potato chips.  ¨ Dont add salt to your food at the table.  ¨ Use only small amounts of salt when cooking.  · Start an exercise program. Talk with your healthcare  provider about the type of exercise program that would be best for you. It doesn't have to be hard. Even brisk walking for 20 minutes 3 times a week is a good form of exercise.  · Dont take medicines that stimulate the heart. This includes many over-the-counter cold and sinus decongestant pills and sprays, as well as diet pills. Check the warnings about hypertension on the label. Before buying any over-the-counter medicines or supplements, always ask the pharmacist about the product's potential interaction with your high blood pressure and your high blood pressure medicines.  · Stimulants such as amphetamine or cocaine could be deadly for someone with high blood pressure. Never take these.  · Limit how much caffeine you get in your diet. Switch to caffeine-free products.  · Stop smoking. If you are a long-time smoker, this can be hard. Talk to your healthcare provider about medicines and nicotine replacement options to help you. Also, enroll in a stop-smoking program to make it more likely that you will quit for good.  · Learn how to handle stress. This is an important part of any program to lower blood pressure. Learn about relaxation methods like meditation, yoga, or biofeedback.  · If your provider prescribed medicines, take them exactly as directed. Missing doses may cause your blood pressure get out of control.  · If you miss a dose or doses, check with your healthcare provider or pharmacist about what to do.  · Consider buying an automatic blood pressure machine. Ask your provider for a recommendation. You can get one of these at most pharmacies.     The American Heart Association recommends the following guidelines for home blood pressure monitoring:  · Don't smoke or drink coffee for 30 minutes before taking your blood pressure.  · Go to the bathroom before the test.  · Relax for 5 minutes before taking the measurement.  · Sit with your back supported (don't sit on a couch or soft chair); keep your feet on  the floor uncrossed. Place your arm on a solid flat surface (like a table) with the upper part of the arm at heart level. Place the middle of the cuff directly above the eye of the elbow. Check the monitor's instruction manual for an illustration.  · Take multiple readings. When you measure, take 2 to 3 readings one minute apart and record all of the results.  · Take your blood pressure at the same time every day, or as your healthcare provider recommends.  · Record the date, time, and blood pressure reading.  · Take the record with you to your next medical appointment. If your blood pressure monitor has a built-in memory, simply take the monitor with you to your next appointment.  · Call your provider if you have several high readings. Don't be frightened by a single high blood pressure reading, but if you get several high readings, check in with your healthcare provider.  · Note: When blood pressure reaches a systolic (top number) of 180 or higher OR diastolic (bottom number) of 110 or higher, seek emergency medical treatment.  Follow-up care  You will need to see your healthcare provider regularly. This is to check your blood pressure and to make changes to your medicines. Make a follow-up appointment as directed. Bring the record of your home blood pressure readings to the appointment.  When to seek medical advice  Call your healthcare provider right away if any of these occur:  · Blood pressure reaches a systolic (upper number) of 180 or higher OR a diastolic (bottom number) of 110 or higher  · Chest pain or shortness of breath  · Severe headache  · Throbbing or rushing sound in the ears  · Nosebleed  · Sudden severe pain in your belly (abdomen)  · Extreme drowsiness, confusion, or fainting  · Dizziness or spinning sensation (vertigo)  · Weakness of an arm or leg or one side of the face  · You have problems speaking or seeing   Date Last Reviewed: 12/1/2016  © 1420-4974 [employer]. [address] [place] [address]  [place] [address]. All rights reserved. This information is not intended as a substitute for professional medical care. Always follow your healthcare professional's instructions.
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
## 2020-09-17 ENCOUNTER — TELEPHONE (OUTPATIENT)
Dept: FAMILY MEDICINE | Facility: CLINIC | Age: 70
End: 2020-09-17

## 2020-09-17 DIAGNOSIS — E03.9 HYPOTHYROIDISM, UNSPECIFIED TYPE: Primary | ICD-10-CM

## 2020-09-17 NOTE — TELEPHONE ENCOUNTER
Nathen this was a reminder Kimberley had to have this patient repeat TSH but looks like you just saw this patient and did not order that.

## 2020-09-17 NOTE — TELEPHONE ENCOUNTER
----- Message from Kallie Mandujano LPN sent at 9/15/2020  4:14 PM CDT -----    ----- Message -----  From: Kimberley Liz LPN  Sent: 9/14/2020  To: Kimberley Liz LPN    Repeat TSH 8 weeks (07/21/2020)

## 2020-09-18 NOTE — TELEPHONE ENCOUNTER
Called pt. LMOR that we are ordering a TSH on him due to abnormal level 8 weeks ago and to go have it done next week.    Order set up to sign.

## 2020-10-06 ENCOUNTER — OFFICE VISIT (OUTPATIENT)
Dept: FAMILY MEDICINE | Facility: CLINIC | Age: 70
End: 2020-10-06
Payer: MEDICARE

## 2020-10-06 VITALS
WEIGHT: 230 LBS | SYSTOLIC BLOOD PRESSURE: 132 MMHG | HEIGHT: 72 IN | TEMPERATURE: 98 F | HEART RATE: 62 BPM | DIASTOLIC BLOOD PRESSURE: 72 MMHG | BODY MASS INDEX: 31.15 KG/M2

## 2020-10-06 DIAGNOSIS — I10 ESSENTIAL HYPERTENSION: ICD-10-CM

## 2020-10-06 DIAGNOSIS — I10 ESSENTIAL HYPERTENSION: Primary | ICD-10-CM

## 2020-10-06 LAB
BUN SERPL-MCNC: 22 MG/DL (ref 7–25)
BUN/CREAT SERPL: 18 (CALC) (ref 6–22)
CALCIUM SERPL-MCNC: 9.2 MG/DL (ref 8.6–10.3)
CHLORIDE SERPL-SCNC: 100 MMOL/L (ref 98–110)
CO2 SERPL-SCNC: 31 MMOL/L (ref 20–32)
CREAT SERPL-MCNC: 1.2 MG/DL (ref 0.7–1.18)
GFRSERPLBLD MDRD-ARVRAT: 60 ML/MIN/1.73M2
GLUCOSE SERPL-MCNC: 148 MG/DL (ref 65–99)
POTASSIUM SERPL-SCNC: 4.6 MMOL/L (ref 3.5–5.3)
SODIUM SERPL-SCNC: 138 MMOL/L (ref 135–146)
TSH SERPL-ACNC: 8.85 MIU/L (ref 0.4–4.5)

## 2020-10-06 PROCEDURE — 99213 PR OFFICE/OUTPT VISIT, EST, LEVL III, 20-29 MIN: ICD-10-PCS | Mod: S$GLB,,, | Performed by: PHYSICIAN ASSISTANT

## 2020-10-06 PROCEDURE — 99213 OFFICE O/P EST LOW 20 MIN: CPT | Mod: S$GLB,,, | Performed by: PHYSICIAN ASSISTANT

## 2020-10-06 RX ORDER — HYDROCHLOROTHIAZIDE 25 MG/1
25 TABLET ORAL DAILY
Qty: 90 TABLET | Refills: 1 | Status: SHIPPED | OUTPATIENT
Start: 2020-10-06 | End: 2020-12-01 | Stop reason: SDUPTHER

## 2020-10-06 NOTE — PATIENT INSTRUCTIONS
Taking Your Blood Pressure  Blood pressure is the force of blood against the artery wall as it moves from the heart through the blood vessels. You can take your own blood pressure reading using a digital monitor. Take your readings the same each time, using the same arm. Take readings as often as your healthcare provider instructs.  About blood pressure monitors  Blood pressure monitors are designed for certain ages and cases. You can find monitors for older adults, for pregnant women, and for children. Make sure the one you choose is the right one for your age and situation.  The American Heart Association recommends an automatic cuff monitor that fits on your upper arm (bicep). The cuff should fit your arm size. A cuff thats too large or too small will not give an accurate reading. Measure around your upper arm to find your size.  Monitors that attach to your finger or wrist are not as accurate as monitors for your upper arm.  Ask your healthcare provider for help in choosing a monitor. Bring your monitor to your next provider visit if you need help in using it the correct way.  The steps below are general instructions for using an automatic digital monitor.  Step 1. Relax    · Take your blood pressure at the same time every day, such as in the morning or evening, or at the time your healthcare provider recommends.  · Wait at least a half-hour after smoking, eating, or exercising. Don't drink coffee, tea, soda, or other caffeinated beverages before checking your blood pressure.  · Sit comfortably at a table with both feet on the floor. Do not cross your legs or feet. Place the monitor near you.  · Rest for a few minutes before you begin.  Step 2. Wrap the cuff    · Place your arm on the table, palm up. Your arm should be at the level of your heart. Wrap the cuff around your upper arm, just above your elbow. Its best done on bare skin, not over clothing. Most cuffs will indicate where the brachial artery (the  blood vessel in the middle of the arm at the inner side of the elbow) should line up with the cuff. Look in your monitor's instruction booklet for an illustration. You can also bring your cuff to your healthcare provider and have them show you how to correctly place the cuff.  Step 3. Inflate the cuff    · Push the button that starts the pump.  · The cuff will tighten, then loosen.  · The numbers will change. When they stop changing, your blood pressure reading will appear.  · Take 2 or 3 readings one minute apart.  Step 4. Write down the results of each reading    · Write down your blood pressure numbers for each reading. Note the date and time. Keep your results in one place, such as a notebook. Even if your monitor has a built-in memory, keep a hard copy of the readings.  · Remove the cuff from your arm. Turn off the machine.  · Bring your blood pressure records with your healthcare providers at each visit.  · If you start a new blood pressure medicine, note the day you started the new medicine. Also note the day if you change the dose of your medicine. This information goes on your blood pressure recording sheet. This will help your healthcare provider monitor how well the medicine changes are working.  · Ask your healthcare provider what numbers should prompt you to call him or her. Also ask what numbers should prompt you to get help right away.  Date Last Reviewed: 11/1/2016 © 2000-2017 [employer]. [address] [place] [id number]. All rights reserved. This information is not intended as a substitute for professional medical care. Always follow your healthcare professional's instructions.        Prevention Guidelines, Men Ages 65 and Older  Screening tests and vaccines are an important part of managing your health. Health counseling is essential, too. Below are guidelines for these, for men ages 65 and older. Talk with your healthcare provider to make sure youre up-to-date on what you  need.  Screening Who needs it How often   Abdominal aortic aneurysm Men ages 65 to 75 who have ever smoked 1 ultrasound   Alcohol misuse All men in this age group At routine exams   Blood pressure All men in this age group Every 2 years if your blood pressure is less than 120/80 mm Hg; yearly if your systolic blood pressure is 120 to 139 mm Hg, or your diastolic blood pressure reading is 80 to 89 mm Hg   Colorectal cancer All men in this age group Flexible sigmoidoscopy every 5 years, or colonoscopy every 10 years, or double-contrast barium enema every 5 years; yearly fecal occult blood test or fecal immunochemical test; or a stool DNA test as often as your healthcare provider advises; talk with your healthcare provider about which tests are best for you and when you no longer need colonoscopies (generally after age 75)   Depression All men in this age group At routine exams   Type 2 diabetes or prediabetes All adults beginning at age 45 and adults without symptoms at any age who are overweight or obese and have 1 or more other risk factors for diabetes At least every 3 years (yearly if your blood sugar has already begun to rise)   Hepatitis C Men at increased risk for infection - talk with your healthcare provider At routine exams   High cholesterol or triglycerides All men in this age group At least every 5 years   HIV Men at increased risk for infection - talk with your healthcare provider At routine exams   Lung cancer Adults ages 55 to 80 who have smoked Yearly screening in smokers with 30 pack-year history of smoking or who quit within 15 years   Obesity All men in this age group At routine exams   Prostate cancer All men in this age group, talk to healthcare provider about risks and benefits of digital rectal exam (ALMA) and prostate-specific antigen (PSA) screening1 At routine exams   Syphilis Men at increased risk for infection - talk with your healthcare provider At routine exams   Tuberculosis Men at  increased risk for infection - talk with your healthcare provider Ask your healthcare provider   Vision All men in this age group Every 1 to 2 years; if you have a chronic health condition, ask your healthcare provider if you needs exams more often   Vaccine Who needs it How often   Chickenpox (varicella) All men in this age group who have no record of this infection or vaccine 2 doses; second dose should be given at least 4 weeks after the first dose   Hepatitis A Men at increased risk for infection - talk with your healthcare provider 2 doses given at least 6 months apart   Hepatitis B Men at increased risk for infection - talk with your healthcare provider 3 doses over 6 months; second dose should be given 1 month after the first dose; the third dose should be given at least 2 months after the second dose and at least 4 months after the first dose   Haemophilus influenzae Type B (HIB) Men at increased risk for infection - talk with your healthcare provider 1 to 3 doses   Influenza (flu) All men in this age group  Once a year   Meningococcal Men at increased risk for infection - talk with your healthcare provider 1 or more doses   Pneumococcal conjugate vaccine (PCV13) and pneumococcal polysaccharide vaccine (PPSV23) All men in this age group 1 dose of each vaccine   Tetanus/diphtheria/  pertussis (Td/Tdap) booster All men in this age group Td every 10 years, or Tdap if you will have contact with a child younger than 12 months old   Zoster All men in this age group 1 dose   Counseling Who needs it How often   Diet and exercise Men who are overweight or obese When diagnosed, and then at routine exams   Fall prevention (exercise, vitamin D supplements) All men in this age group At routine exams   Sexually transmitted infection Men at increased risk for infection - talk with your healthcare provider At routine exams   Use of daily aspirin Men ages 45 to 79 at risk for cardiovascular health problems At routine exams    Use of tobacco and the health effects it can cause All men in this age group Every visit   [address] [place] [address] [facility]   Date Last Reviewed: 2/1/2017  © 0847-9586 [employer]. [address] [place] [address], [place] [address]. All rights reserved. This information is not intended as a substitute for professional medical care. Always follow your healthcare professional's instructions.

## 2020-10-06 NOTE — PROGRESS NOTES
Subjective:       Patient ID: Richy Hdz is a 69 y.o. male.    Chief Complaint: Follow-up (- no bottles - denied flu vaccine - tk)    69 year old male here for a BP follow up.  His BP today was stable at 132/72.  The last visit we added on HCTZ. He has not had any trouble with the HCTZ.  He is waiting on a coupon from [facility] to get 40% off a blood pressure meter due to his breaking.  He is planning to start checking his BP at home.  He has no acute complaints today.     Review of patient's allergies indicates:  No Known Allergies      Current Outpatient Medications:     allopurinoL (ZYLOPRIM) 300 MG tablet, Take 1 tablet (300 mg total) by mouth once daily., Disp: 90 tablet, Rfl: 1    amLODIPine (NORVASC) 10 MG tablet, Take 1 tablet (10 mg total) by mouth once daily., Disp: 90 tablet, Rfl: 1    atorvastatin (LIPITOR) 20 MG tablet, Take 1 tablet (20 mg total) by mouth once daily., Disp: 90 tablet, Rfl: 1    hydroCHLOROthiazide (HYDRODIURIL) 25 MG tablet, Take 1 tablet (25 mg total) by mouth once daily., Disp: 30 tablet, Rfl: 2    iron,carbonyl/ascorbic acid (VITRON-C ORAL), Take by mouth., Disp: , Rfl:     levothyroxine (SYNTHROID) 200 MCG tablet, Take 1 tablet (200 mcg total) by mouth once daily., Disp: 90 tablet, Rfl: 1    levothyroxine (SYNTHROID) 50 MCG tablet, Take 1 tablet (50 mcg total) by mouth before breakfast., Disp: 90 tablet, Rfl: 1    mv with min-lycopene-lutein (CENTRUM SILVER) 0.4-300-250 mg-mcg-mcg Tab, Take 1 tablet by mouth once daily., Disp: , Rfl:     nebivoloL (BYSTOLIC) 10 MG Tab, Take 2 tablets (20 mg total) by mouth once daily., Disp: 180 tablet, Rfl: 1    olmesartan (BENICAR) 40 MG tablet, Take 1 tablet (40 mg total) by mouth once daily., Disp: 90 tablet, Rfl: 1    omeprazole (PRILOSEC) 20 MG capsule, Take 1 capsule (20 mg total) by mouth once daily., Disp: 90 capsule, Rfl: 1    terazosin (HYTRIN) 5 MG capsule, Take 1 capsule (5 mg total) by mouth every evening., Disp: 90  capsule, Rfl: 1    Lab Results   Component Value Date    WBC 6.2 06/09/2020    HGB 15.4 06/09/2020    HCT 47.7 06/09/2020     06/09/2020    CHOL 182 06/09/2020    TRIG 187 (H) 06/09/2020    HDL 52 06/09/2020    ALT 30 06/09/2020    AST 29 06/09/2020     10/05/2020    K 4.6 10/05/2020     10/05/2020    CREATININE 1.20 (H) 10/05/2020    BUN 22 10/05/2020    CO2 31 10/05/2020    TSH 8.85 (H) 10/05/2020    MICROALBUR 10.0 06/09/2020       Review of Systems   Constitutional: Negative for activity change, appetite change and fatigue.   HENT: Negative for congestion.    Eyes: Negative for visual disturbance.   Respiratory: Negative for cough, chest tightness and shortness of breath.    Cardiovascular: Negative for chest pain, palpitations and leg swelling.   Gastrointestinal: Positive for diarrhea (chronic). Negative for abdominal pain and constipation.   Endocrine: Negative for cold intolerance and heat intolerance.   Genitourinary: Negative for decreased urine volume, difficulty urinating, dysuria and urgency.   Musculoskeletal: Negative for arthralgias, back pain and myalgias.   Skin: Negative for rash.   Neurological: Negative for dizziness, weakness, light-headedness and headaches.   Psychiatric/Behavioral: Negative for confusion and sleep disturbance. The patient is not nervous/anxious.        Objective:      Physical Exam  Constitutional:       General: He is not in acute distress.     Appearance: Normal appearance.   HENT:      Head: Normocephalic and atraumatic.   Eyes:      Pupils: Pupils are equal, round, and reactive to light.   Neck:      Musculoskeletal: Normal range of motion and neck supple. No neck rigidity or muscular tenderness.   Cardiovascular:      Rate and Rhythm: Normal rate and regular rhythm.      Pulses: Normal pulses.      Heart sounds: Normal heart sounds.   Pulmonary:      Effort: Pulmonary effort is normal. No respiratory distress.      Breath sounds: Normal breath sounds.  No wheezing.   Abdominal:      General: Abdomen is flat. There is no distension.      Palpations: Abdomen is soft.      Tenderness: There is no abdominal tenderness.   Musculoskeletal: Normal range of motion.      Right lower leg: No edema.      Left lower leg: No edema.   Skin:     General: Skin is warm and dry.      Coloration: Skin is not jaundiced.      Findings: No bruising.   Neurological:      General: No focal deficit present.      Mental Status: He is alert and oriented to person, place, and time.      Cranial Nerves: No cranial nerve deficit.   Psychiatric:         Mood and Affect: Mood normal.         Behavior: Behavior normal.         Thought Content: Thought content normal.         Judgment: Judgment normal.         Assessment:       1. Essential hypertension        Plan:       Essential hypersension: BP stable today, would like for patient to obtain at home BP monitor once he receives his [facility] coupon  Continue amlodipine 10 mg, olmesartan 40 mg, and HCTZ 25 mg

## 2020-11-05 ENCOUNTER — TELEPHONE (OUTPATIENT)
Dept: FAMILY MEDICINE | Facility: CLINIC | Age: 70
End: 2020-11-05

## 2020-11-05 ENCOUNTER — OFFICE VISIT (OUTPATIENT)
Dept: FAMILY MEDICINE | Facility: CLINIC | Age: 70
End: 2020-11-05
Payer: MEDICARE

## 2020-11-05 VITALS
WEIGHT: 230 LBS | HEIGHT: 71 IN | HEART RATE: 68 BPM | SYSTOLIC BLOOD PRESSURE: 138 MMHG | DIASTOLIC BLOOD PRESSURE: 72 MMHG | TEMPERATURE: 98 F | BODY MASS INDEX: 32.2 KG/M2

## 2020-11-05 DIAGNOSIS — M54.9 ACUTE LEFT-SIDED BACK PAIN, UNSPECIFIED BACK LOCATION: Primary | ICD-10-CM

## 2020-11-05 DIAGNOSIS — Z87.442 HISTORY OF KIDNEY STONES: ICD-10-CM

## 2020-11-05 LAB
BILIRUB UR QL STRIP: NEGATIVE
GLUCOSE UR QL STRIP: NEGATIVE
KETONES UR QL STRIP: NEGATIVE
LEUKOCYTE ESTERASE UR QL STRIP: NEGATIVE
PH, POC UA: 7
POC BLOOD, URINE: NEGATIVE
POC NITRATES, URINE: NEGATIVE
PROT UR QL STRIP: NEGATIVE
SP GR UR STRIP: 1.01 (ref 1–1.03)
UROBILINOGEN UR STRIP-ACNC: ABNORMAL (ref 0.3–2.2)

## 2020-11-05 PROCEDURE — 81003 URINALYSIS AUTO W/O SCOPE: CPT | Mod: QW,S$GLB,, | Performed by: NURSE PRACTITIONER

## 2020-11-05 PROCEDURE — 99213 OFFICE O/P EST LOW 20 MIN: CPT | Mod: 25,S$GLB,, | Performed by: NURSE PRACTITIONER

## 2020-11-05 PROCEDURE — 81003 POCT URINALYSIS, DIPSTICK, AUTOMATED, W/O SCOPE: ICD-10-PCS | Mod: QW,S$GLB,, | Performed by: NURSE PRACTITIONER

## 2020-11-05 PROCEDURE — 99213 PR OFFICE/OUTPT VISIT, EST, LEVL III, 20-29 MIN: ICD-10-PCS | Mod: 25,S$GLB,, | Performed by: NURSE PRACTITIONER

## 2020-11-05 NOTE — PROGRESS NOTES
SUBJECTIVE:    Patient ID: Richy Hdz is a 69 y.o. male.    Chief Complaint: Back Pain (no bottles, no refills, declined flu and pna shots//DJ)    Pt presents with c/o left-sided back pain. Started yesterday. No traumatic event, no recent increase in physical activity. Pain is on the left, mid-low back and radiates up to mid-left back. No midline pain. No pain radiating to leg. Denies nausea, vomiting, fever, or difficulty voiding. Pt does have history of kidney stones and states pain feels very similar to that of the past.       Telephone on 09/17/2020   Component Date Value Ref Range Status    TSH 10/05/2020 8.85* 0.40 - 4.50 mIU/L Final   Office Visit on 09/08/2020   Component Date Value Ref Range Status    Glucose 10/05/2020 148* 65 - 99 mg/dL Final    BUN 10/05/2020 22  7 - 25 mg/dL Final    Creatinine 10/05/2020 1.20* 0.70 - 1.18 mg/dL Final    eGFR if non African American 10/05/2020 60  > OR = 60 mL/min/1.73m2 Final    eGFR if African American 10/05/2020 70  > OR = 60 mL/min/1.73m2 Final    BUN/Creatinine Ratio 10/05/2020 18  6 - 22 (calc) Final    Sodium 10/05/2020 138  135 - 146 mmol/L Final    Potassium 10/05/2020 4.6  3.5 - 5.3 mmol/L Final    Chloride 10/05/2020 100  98 - 110 mmol/L Final    CO2 10/05/2020 31  20 - 32 mmol/L Final    Calcium 10/05/2020 9.2  8.6 - 10.3 mg/dL Final   Telephone on 07/09/2020   Component Date Value Ref Range Status    TSH 07/14/2020 9.65* 0.40 - 4.50 mIU/L Final   Office Visit on 06/09/2020   Component Date Value Ref Range Status    WBC 06/09/2020 6.2  3.8 - 10.8 Thousand/uL Final    RBC 06/09/2020 5.22  4.20 - 5.80 Million/uL Final    Hemoglobin 06/09/2020 15.4  13.2 - 17.1 g/dL Final    Hematocrit 06/09/2020 47.7  38.5 - 50.0 % Final    MCV 06/09/2020 91.4  80.0 - 100.0 fL Final    MCH 06/09/2020 29.5  27.0 - 33.0 pg Final    MCHC 06/09/2020 32.3  32.0 - 36.0 g/dL Final    RDW 06/09/2020 13.0  11.0 - 15.0 % Final    Platelets 06/09/2020 165   140 - 400 Thousand/uL Final    MPV 06/09/2020 12.9* 7.5 - 12.5 fL Final    Neutrophils Absolute 06/09/2020 3,652  1,500 - 7,800 cells/uL Final    Lymph # 06/09/2020 1,767  850 - 3,900 cells/uL Final    Mono # 06/09/2020 490  200 - 950 cells/uL Final    Eos # 06/09/2020 198  15 - 500 cells/uL Final    Baso # 06/09/2020 93  0 - 200 cells/uL Final    Neutrophils Relative 06/09/2020 58.9  % Final    Lymph % 06/09/2020 28.5  % Final    Mono % 06/09/2020 7.9  % Final    Eosinophil % 06/09/2020 3.2  % Final    Basophil % 06/09/2020 1.5  % Final    Glucose 06/09/2020 124* 65 - 99 mg/dL Final    BUN 06/09/2020 19  7 - 25 mg/dL Final    Creatinine 06/09/2020 1.09  0.70 - 1.18 mg/dL Final    eGFR if non African American 06/09/2020 68  > OR = 60 mL/min/1.73m2 Final    eGFR if African American 06/09/2020 79  > OR = 60 mL/min/1.73m2 Final    BUN/Creatinine Ratio 06/09/2020 NOT APPLICABLE  6 - 22 (calc) Final    Sodium 06/09/2020 139  135 - 146 mmol/L Final    Potassium 06/09/2020 4.7  3.5 - 5.3 mmol/L Final    Chloride 06/09/2020 102  98 - 110 mmol/L Final    CO2 06/09/2020 28  20 - 32 mmol/L Final    Calcium 06/09/2020 9.5  8.6 - 10.3 mg/dL Final    Total Protein 06/09/2020 6.6  6.1 - 8.1 g/dL Final    Albumin 06/09/2020 4.3  3.6 - 5.1 g/dL Final    Globulin, Total 06/09/2020 2.3  1.9 - 3.7 g/dL (calc) Final    Albumin/Globulin Ratio 06/09/2020 1.9  1.0 - 2.5 (calc) Final    Total Bilirubin 06/09/2020 0.6  0.2 - 1.2 mg/dL Final    Alkaline Phosphatase 06/09/2020 87  35 - 144 U/L Final    AST 06/09/2020 29  10 - 35 U/L Final    ALT 06/09/2020 30  9 - 46 U/L Final    Cholesterol 06/09/2020 182  <200 mg/dL Final    HDL 06/09/2020 52  > OR = 40 mg/dL Final    Triglycerides 06/09/2020 187* <150 mg/dL Final    LDL Cholesterol 06/09/2020 100* mg/dL (calc) Final    HDL/Cholesterol Ratio 06/09/2020 3.5  <5.0 (calc) Final    Non HDL Chol. (LDL+VLDL) 06/09/2020 130* <130 mg/dL (calc) Final    TSH  w/reflex to FT4 06/09/2020 36.30* 0.40 - 4.50 mIU/L Final    T4, Free 06/09/2020 0.8  0.8 - 1.8 ng/dL Final    PROSTATE SPECIFIC ANTIGEN, SCR - Q* 06/09/2020 1.8  < OR = 4.0 ng/mL Final    Creatinine, Urine 06/09/2020 453* 20 - 320 mg/dL Final    Microalb, Ur 06/09/2020 10.0  See Note: mg/dL Final    Microalb/Creat Ratio 06/09/2020 22  <30 mcg/mg creat Final    Color, UA 06/09/2020 DARK YELLOW  YELLOW Final    Appearance, UA 06/09/2020 CLEAR  CLEAR Final    Specific Gravity, UA 06/09/2020 1.032  1.001 - 1.035 Final    pH, UA 06/09/2020 < OR = 5.0  5.0 - 8.0 Final    Glucose, UA 06/09/2020 NEGATIVE  NEGATIVE Final    Bilirubin, UA 06/09/2020 NEGATIVE  NEGATIVE Final    Ketones, UA 06/09/2020 TRACE* NEGATIVE Final    Occult Blood UA 06/09/2020 NEGATIVE  NEGATIVE Final    Protein, UA 06/09/2020 TRACE* NEGATIVE Final    Nitrite, UA 06/09/2020 NEGATIVE  NEGATIVE Final    Leukocytes, UA 06/09/2020 NEGATIVE  NEGATIVE Final    WBC Casts, UA 06/09/2020 NONE SEEN  < OR = 5 /HPF Final    RBC Casts, UA 06/09/2020 0-2  < OR = 2 /HPF Final    Squam Epithel, UA 06/09/2020 0-5  < OR = 5 /HPF Final    Bacteria, UA 06/09/2020 NONE SEEN  NONE SEEN /HPF Final    Hyaline Casts, UA 06/09/2020 NONE SEEN  NONE SEEN /LPF Final    Reflexive Urine Culture 06/09/2020 NO CULTURE INDICATED   Final       Past Medical History:   Diagnosis Date    Back pain     Cancer     skin-ear    Gout attack     Hyperlipemia     Hypertension     Thyroid disease      Past Surgical History:   Procedure Laterality Date    JOINT REPLACEMENT      bilateral shoulder    kidney stones      SKIN CANCER EXCISION  2013    x 2 to ear.    TONSILLECTOMY      VASECTOMY       History reviewed. No pertinent family history.    Marital Status:   Alcohol History:  reports current alcohol use of about 7.0 standard drinks of alcohol per week.  Tobacco History:  reports that he has been smoking cigars. He has smoked for the past 0.00 years. He  has never used smokeless tobacco.  Drug History:  has no history on file for drug.    Review of patient's allergies indicates:  No Known Allergies    Current Outpatient Medications:     allopurinoL (ZYLOPRIM) 300 MG tablet, Take 1 tablet (300 mg total) by mouth once daily., Disp: 90 tablet, Rfl: 1    amLODIPine (NORVASC) 10 MG tablet, Take 1 tablet (10 mg total) by mouth once daily., Disp: 90 tablet, Rfl: 1    atorvastatin (LIPITOR) 20 MG tablet, Take 1 tablet (20 mg total) by mouth once daily., Disp: 90 tablet, Rfl: 1    hydroCHLOROthiazide (HYDRODIURIL) 25 MG tablet, Take 1 tablet (25 mg total) by mouth once daily., Disp: 90 tablet, Rfl: 1    iron,carbonyl/ascorbic acid (VITRON-C ORAL), Take by mouth., Disp: , Rfl:     levothyroxine (SYNTHROID) 200 MCG tablet, Take 1 tablet (200 mcg total) by mouth once daily., Disp: 90 tablet, Rfl: 1    levothyroxine (SYNTHROID) 50 MCG tablet, Take 1 tablet (50 mcg total) by mouth before breakfast., Disp: 90 tablet, Rfl: 1    mv with min-lycopene-lutein (CENTRUM SILVER) 0.4-300-250 mg-mcg-mcg Tab, Take 1 tablet by mouth once daily., Disp: , Rfl:     nebivoloL (BYSTOLIC) 10 MG Tab, Take 2 tablets (20 mg total) by mouth once daily., Disp: 180 tablet, Rfl: 1    olmesartan (BENICAR) 40 MG tablet, Take 1 tablet (40 mg total) by mouth once daily., Disp: 90 tablet, Rfl: 1    omeprazole (PRILOSEC) 20 MG capsule, Take 1 capsule (20 mg total) by mouth once daily., Disp: 90 capsule, Rfl: 1    terazosin (HYTRIN) 5 MG capsule, Take 1 capsule (5 mg total) by mouth every evening., Disp: 90 capsule, Rfl: 1    Review of Systems   Constitutional: Negative for fatigue and fever.   HENT: Negative.    Respiratory: Negative for shortness of breath.    Cardiovascular: Negative for chest pain and palpitations.   Gastrointestinal: Negative for abdominal pain, nausea and vomiting.   Genitourinary: Negative for decreased urine volume and dysuria.   Musculoskeletal: Positive for back pain.  "  Neurological: Negative for headaches.          Objective:      Vitals:    11/05/20 1117   BP: 138/72   Pulse: 68   Temp: 98.2 °F (36.8 °C)   Weight: 104.3 kg (230 lb)   Height: 5' 11" (1.803 m)     Body mass index is 32.08 kg/m².  Physical Exam  Constitutional:       Appearance: Normal appearance.   Cardiovascular:      Heart sounds: Normal heart sounds.   Pulmonary:      Effort: No respiratory distress.   Musculoskeletal: Normal range of motion.        Back:    Skin:     General: Skin is warm and dry.      Capillary Refill: Capillary refill takes less than 2 seconds.   Neurological:      Mental Status: He is alert and oriented to person, place, and time.           Assessment:       1. Acute left-sided back pain, unspecified back location    2. History of kidney stones         Plan:       Acute left-sided back pain, unspecified back location  -     POCT Urinalysis, Dipstick, Automated, W/O Scope    History of kidney stones    Possible kidney stones vs musculoskeletal pain. Pt with no other symptoms, do not recommend imaging at this time. Declines rx pain medication. Recommend increasing water intake, Ibuprofen, and straining urine. Discussed red flag signs that warrant ER visit.     Follow up if symptoms worsen or fail to improve.              "

## 2020-11-05 NOTE — TELEPHONE ENCOUNTER
----- Message from Fely Lakhani sent at 11/5/2020  9:41 AM CST -----  Pt calling said he may have strained his back yesterday while doing chores around the house this morning he stated the left lower back is sensitive to touch and he his concerned of a possible kidney stone. Pt scheduled today @ 11:00 with Rosemary

## 2020-12-01 DIAGNOSIS — I10 ESSENTIAL HYPERTENSION: ICD-10-CM

## 2020-12-01 RX ORDER — HYDROCHLOROTHIAZIDE 25 MG/1
25 TABLET ORAL DAILY
Qty: 90 TABLET | Refills: 1 | Status: SHIPPED | OUTPATIENT
Start: 2020-12-01 | End: 2021-05-26 | Stop reason: SDUPTHER

## 2020-12-01 NOTE — TELEPHONE ENCOUNTER
----- Message from Ester Stallings sent at 12/1/2020 11:29 AM CST -----  Regarding: refills  Hydrochlorothiazide   [facility]   Pt [phone]

## 2020-12-14 ENCOUNTER — HOSPITAL ENCOUNTER (OUTPATIENT)
Dept: RADIOLOGY | Facility: HOSPITAL | Age: 70
Discharge: HOME | End: 2020-12-14
Payer: MEDICARE

## 2020-12-14 DIAGNOSIS — T18.2XXA FOREIGN BODY IN STOMACH: Primary | ICD-10-CM

## 2020-12-14 DIAGNOSIS — T17.800D: ICD-10-CM

## 2020-12-14 DIAGNOSIS — T18.2XXA FOREIGN BODY IN STOMACH: ICD-10-CM

## 2020-12-14 PROCEDURE — 71046 X-RAY EXAM CHEST 2 VIEWS: CPT | Mod: TC

## 2020-12-14 PROCEDURE — 74018 RADEX ABDOMEN 1 VIEW: CPT | Mod: TC

## 2021-01-26 ENCOUNTER — PATIENT MESSAGE (OUTPATIENT)
Dept: FAMILY MEDICINE | Facility: CLINIC | Age: 71
End: 2021-01-26

## 2021-02-19 ENCOUNTER — PATIENT MESSAGE (OUTPATIENT)
Dept: ADMINISTRATIVE | Facility: CLINIC | Age: 71
End: 2021-02-19

## 2021-03-20 ENCOUNTER — IMMUNIZATION (OUTPATIENT)
Dept: PRIMARY CARE CLINIC | Facility: CLINIC | Age: 71
End: 2021-03-20

## 2021-03-20 DIAGNOSIS — Z23 NEED FOR VACCINATION: Primary | ICD-10-CM

## 2021-03-20 PROCEDURE — 91300 COVID-19, MRNA, LNP-S, PF, 30 MCG/0.3 ML DOSE VACCINE: CPT | Mod: S$GLB,,, | Performed by: FAMILY MEDICINE

## 2021-03-20 PROCEDURE — 0001A COVID-19, MRNA, LNP-S, PF, 30 MCG/0.3 ML DOSE VACCINE: CPT | Mod: CV19,S$GLB,, | Performed by: FAMILY MEDICINE

## 2021-03-20 PROCEDURE — 0001A COVID-19, MRNA, LNP-S, PF, 30 MCG/0.3 ML DOSE VACCINE: ICD-10-PCS | Mod: CV19,S$GLB,, | Performed by: FAMILY MEDICINE

## 2021-03-20 PROCEDURE — 91300 COVID-19, MRNA, LNP-S, PF, 30 MCG/0.3 ML DOSE VACCINE: ICD-10-PCS | Mod: S$GLB,,, | Performed by: FAMILY MEDICINE

## 2021-03-22 DIAGNOSIS — I10 ESSENTIAL HYPERTENSION: ICD-10-CM

## 2021-03-22 DIAGNOSIS — E03.9 HYPOTHYROIDISM, UNSPECIFIED TYPE: ICD-10-CM

## 2021-03-22 RX ORDER — LEVOTHYROXINE SODIUM 50 UG/1
50 TABLET ORAL
Qty: 90 TABLET | Refills: 0 | Status: SHIPPED | OUTPATIENT
Start: 2021-03-22 | End: 2021-08-16 | Stop reason: SDUPTHER

## 2021-03-22 RX ORDER — OLMESARTAN MEDOXOMIL 40 MG/1
40 TABLET ORAL DAILY
Qty: 90 TABLET | Refills: 0 | Status: SHIPPED | OUTPATIENT
Start: 2021-03-22 | End: 2021-08-16 | Stop reason: SDUPTHER

## 2021-03-22 RX ORDER — LEVOTHYROXINE SODIUM 200 UG/1
200 TABLET ORAL DAILY
Qty: 90 TABLET | Refills: 0 | Status: SHIPPED | OUTPATIENT
Start: 2021-03-22 | End: 2021-08-16 | Stop reason: SDUPTHER

## 2021-04-14 ENCOUNTER — IMMUNIZATION (OUTPATIENT)
Dept: PRIMARY CARE CLINIC | Facility: CLINIC | Age: 71
End: 2021-04-14

## 2021-04-14 DIAGNOSIS — Z23 NEED FOR VACCINATION: Primary | ICD-10-CM

## 2021-04-14 PROCEDURE — 91300 COVID-19, MRNA, LNP-S, PF, 30 MCG/0.3 ML DOSE VACCINE: ICD-10-PCS | Mod: S$GLB,,, | Performed by: FAMILY MEDICINE

## 2021-04-14 PROCEDURE — 91300 COVID-19, MRNA, LNP-S, PF, 30 MCG/0.3 ML DOSE VACCINE: CPT | Mod: S$GLB,,, | Performed by: FAMILY MEDICINE

## 2021-04-14 PROCEDURE — 0002A COVID-19, MRNA, LNP-S, PF, 30 MCG/0.3 ML DOSE VACCINE: CPT | Mod: CV19,S$GLB,, | Performed by: FAMILY MEDICINE

## 2021-04-14 PROCEDURE — 0002A COVID-19, MRNA, LNP-S, PF, 30 MCG/0.3 ML DOSE VACCINE: ICD-10-PCS | Mod: CV19,S$GLB,, | Performed by: FAMILY MEDICINE

## 2021-05-26 ENCOUNTER — PATIENT MESSAGE (OUTPATIENT)
Dept: FAMILY MEDICINE | Facility: CLINIC | Age: 71
End: 2021-05-26

## 2021-05-26 DIAGNOSIS — I10 ESSENTIAL HYPERTENSION: ICD-10-CM

## 2021-05-26 RX ORDER — HYDROCHLOROTHIAZIDE 25 MG/1
25 TABLET ORAL DAILY
Qty: 90 TABLET | Refills: 1 | Status: SHIPPED | OUTPATIENT
Start: 2021-05-26 | End: 2021-08-16 | Stop reason: SDUPTHER

## 2021-05-26 RX ORDER — AMLODIPINE BESYLATE 10 MG/1
10 TABLET ORAL DAILY
Qty: 90 TABLET | Refills: 1 | Status: SHIPPED | OUTPATIENT
Start: 2021-05-26 | End: 2021-06-28 | Stop reason: SDUPTHER

## 2021-06-28 DIAGNOSIS — I10 ESSENTIAL HYPERTENSION: ICD-10-CM

## 2021-06-28 RX ORDER — AMLODIPINE BESYLATE 10 MG/1
10 TABLET ORAL DAILY
Qty: 90 TABLET | Refills: 1 | Status: SHIPPED | OUTPATIENT
Start: 2021-06-28 | End: 2021-08-16 | Stop reason: SDUPTHER

## 2021-07-07 ENCOUNTER — PATIENT MESSAGE (OUTPATIENT)
Dept: FAMILY MEDICINE | Facility: CLINIC | Age: 71
End: 2021-07-07

## 2021-07-07 DIAGNOSIS — I10 ESSENTIAL HYPERTENSION: ICD-10-CM

## 2021-07-07 RX ORDER — NEBIVOLOL 10 MG/1
20 TABLET ORAL DAILY
Qty: 180 TABLET | Refills: 1 | Status: SHIPPED | OUTPATIENT
Start: 2021-07-07 | End: 2021-08-16 | Stop reason: SDUPTHER

## 2021-07-08 ENCOUNTER — OFFICE VISIT (OUTPATIENT)
Dept: FAMILY MEDICINE | Facility: CLINIC | Age: 71
End: 2021-07-08
Payer: MEDICARE

## 2021-07-08 VITALS
DIASTOLIC BLOOD PRESSURE: 78 MMHG | SYSTOLIC BLOOD PRESSURE: 116 MMHG | HEIGHT: 71 IN | WEIGHT: 236 LBS | HEART RATE: 56 BPM | BODY MASS INDEX: 33.04 KG/M2

## 2021-07-08 DIAGNOSIS — K57.92 DIVERTICULITIS: Primary | ICD-10-CM

## 2021-07-08 PROCEDURE — 99213 PR OFFICE/OUTPT VISIT, EST, LEVL III, 20-29 MIN: ICD-10-PCS | Mod: S$GLB,,, | Performed by: PHYSICIAN ASSISTANT

## 2021-07-08 PROCEDURE — 99213 OFFICE O/P EST LOW 20 MIN: CPT | Mod: S$GLB,,, | Performed by: PHYSICIAN ASSISTANT

## 2021-07-08 RX ORDER — CIPROFLOXACIN 500 MG/1
500 TABLET ORAL 2 TIMES DAILY
Qty: 20 TABLET | Refills: 0 | Status: SHIPPED | OUTPATIENT
Start: 2021-07-08 | End: 2021-07-18

## 2021-07-08 RX ORDER — METRONIDAZOLE 500 MG/1
500 TABLET ORAL 3 TIMES DAILY
Qty: 21 TABLET | Refills: 0 | Status: SHIPPED | OUTPATIENT
Start: 2021-07-08 | End: 2021-07-15

## 2021-08-16 DIAGNOSIS — I10 ESSENTIAL HYPERTENSION: ICD-10-CM

## 2021-08-16 DIAGNOSIS — K21.9 GASTROESOPHAGEAL REFLUX DISEASE: ICD-10-CM

## 2021-08-16 DIAGNOSIS — E78.5 HYPERLIPIDEMIA, UNSPECIFIED HYPERLIPIDEMIA TYPE: ICD-10-CM

## 2021-08-16 DIAGNOSIS — E03.9 HYPOTHYROIDISM, UNSPECIFIED TYPE: ICD-10-CM

## 2021-08-16 RX ORDER — AMLODIPINE BESYLATE 10 MG/1
10 TABLET ORAL DAILY
Qty: 90 TABLET | Refills: 1 | Status: SHIPPED | OUTPATIENT
Start: 2021-08-16 | End: 2022-02-15 | Stop reason: SDUPTHER

## 2021-08-16 RX ORDER — LEVOTHYROXINE SODIUM 50 UG/1
50 TABLET ORAL
Qty: 90 TABLET | Refills: 0 | Status: SHIPPED | OUTPATIENT
Start: 2021-08-16 | End: 2022-02-15 | Stop reason: SDUPTHER

## 2021-08-16 RX ORDER — NEBIVOLOL 10 MG/1
20 TABLET ORAL DAILY
Qty: 180 TABLET | Refills: 1 | Status: SHIPPED | OUTPATIENT
Start: 2021-08-16 | End: 2022-02-15 | Stop reason: SDUPTHER

## 2021-08-16 RX ORDER — LEVOTHYROXINE SODIUM 200 UG/1
200 TABLET ORAL DAILY
Qty: 90 TABLET | Refills: 0 | Status: SHIPPED | OUTPATIENT
Start: 2021-08-16 | End: 2022-02-15 | Stop reason: SDUPTHER

## 2021-08-16 RX ORDER — ATORVASTATIN CALCIUM 20 MG/1
20 TABLET, FILM COATED ORAL DAILY
Qty: 90 TABLET | Refills: 1 | Status: SHIPPED | OUTPATIENT
Start: 2021-08-16 | End: 2022-02-15 | Stop reason: SDUPTHER

## 2021-08-16 RX ORDER — HYDROCHLOROTHIAZIDE 25 MG/1
25 TABLET ORAL DAILY
Qty: 90 TABLET | Refills: 1 | Status: SHIPPED | OUTPATIENT
Start: 2021-08-16 | End: 2022-02-15 | Stop reason: SDUPTHER

## 2021-08-16 RX ORDER — TERAZOSIN 5 MG/1
5 CAPSULE ORAL NIGHTLY
Qty: 90 CAPSULE | Refills: 1 | Status: SHIPPED | OUTPATIENT
Start: 2021-08-16 | End: 2022-02-15 | Stop reason: SDUPTHER

## 2021-08-16 RX ORDER — OLMESARTAN MEDOXOMIL 40 MG/1
40 TABLET ORAL DAILY
Qty: 90 TABLET | Refills: 0 | Status: SHIPPED | OUTPATIENT
Start: 2021-08-16 | End: 2022-02-15 | Stop reason: SDUPTHER

## 2021-08-16 RX ORDER — OMEPRAZOLE 20 MG/1
20 CAPSULE, DELAYED RELEASE ORAL DAILY
Qty: 90 CAPSULE | Refills: 1 | Status: SHIPPED | OUTPATIENT
Start: 2021-08-16 | End: 2022-02-15 | Stop reason: SDUPTHER

## 2021-08-19 ENCOUNTER — TELEPHONE (OUTPATIENT)
Dept: FAMILY MEDICINE | Facility: CLINIC | Age: 71
End: 2021-08-19

## 2021-12-13 ENCOUNTER — IMMUNIZATION (OUTPATIENT)
Dept: PRIMARY CARE CLINIC | Facility: CLINIC | Age: 71
End: 2021-12-13
Payer: MEDICARE

## 2021-12-13 DIAGNOSIS — Z23 NEED FOR VACCINATION: Primary | ICD-10-CM

## 2021-12-13 PROCEDURE — 0004A COVID-19, MRNA, LNP-S, PF, 30 MCG/0.3 ML DOSE VACCINE: ICD-10-PCS | Mod: S$GLB,,, | Performed by: FAMILY MEDICINE

## 2021-12-13 PROCEDURE — 91300 COVID-19, MRNA, LNP-S, PF, 30 MCG/0.3 ML DOSE VACCINE: ICD-10-PCS | Mod: S$GLB,,, | Performed by: FAMILY MEDICINE

## 2021-12-13 PROCEDURE — 0004A COVID-19, MRNA, LNP-S, PF, 30 MCG/0.3 ML DOSE VACCINE: CPT | Mod: S$GLB,,, | Performed by: FAMILY MEDICINE

## 2021-12-13 PROCEDURE — 91300 COVID-19, MRNA, LNP-S, PF, 30 MCG/0.3 ML DOSE VACCINE: CPT | Mod: S$GLB,,, | Performed by: FAMILY MEDICINE

## 2021-12-20 ENCOUNTER — TELEPHONE (OUTPATIENT)
Dept: FAMILY MEDICINE | Facility: CLINIC | Age: 71
End: 2021-12-20
Payer: MEDICARE

## 2021-12-20 DIAGNOSIS — E03.9 HYPOTHYROIDISM, UNSPECIFIED TYPE: ICD-10-CM

## 2021-12-20 DIAGNOSIS — E78.5 HYPERLIPIDEMIA, UNSPECIFIED HYPERLIPIDEMIA TYPE: ICD-10-CM

## 2021-12-20 DIAGNOSIS — Z79.899 ENCOUNTER FOR LONG-TERM (CURRENT) USE OF OTHER MEDICATIONS: Primary | ICD-10-CM

## 2021-12-20 DIAGNOSIS — Z12.5 SPECIAL SCREENING FOR MALIGNANT NEOPLASM OF PROSTATE: ICD-10-CM

## 2022-01-06 ENCOUNTER — TELEPHONE (OUTPATIENT)
Dept: FAMILY MEDICINE | Facility: CLINIC | Age: 72
End: 2022-01-06
Payer: MEDICARE

## 2022-01-06 NOTE — TELEPHONE ENCOUNTER
----- Message from Roseann Oglesby sent at 1/6/2022  2:31 PM CST -----  Regarding: appointment  Pt had to cancel apt.for Monday needs a afternoon appointment please call [phone]

## 2022-02-07 ENCOUNTER — TELEPHONE (OUTPATIENT)
Dept: FAMILY MEDICINE | Facility: CLINIC | Age: 72
End: 2022-02-07
Payer: MEDICARE

## 2022-02-10 ENCOUNTER — TELEPHONE (OUTPATIENT)
Dept: FAMILY MEDICINE | Facility: CLINIC | Age: 72
End: 2022-02-10
Payer: MEDICARE

## 2022-02-10 DIAGNOSIS — E03.9 HYPOTHYROIDISM, UNSPECIFIED TYPE: ICD-10-CM

## 2022-02-10 LAB
ALBUMIN SERPL-MCNC: 4.1 G/DL (ref 3.6–5.1)
ALBUMIN/CREAT UR: 41 MCG/MG CREAT
ALBUMIN/GLOB SERPL: 1.6 (CALC) (ref 1–2.5)
ALP SERPL-CCNC: 88 U/L (ref 35–144)
ALT SERPL-CCNC: 35 U/L (ref 9–46)
APPEARANCE UR: CLEAR
AST SERPL-CCNC: 25 U/L (ref 10–35)
BACTERIA #/AREA URNS HPF: NORMAL /HPF
BACTERIA UR CULT: NORMAL
BASOPHILS # BLD AUTO: 104 CELLS/UL (ref 0–200)
BASOPHILS NFR BLD AUTO: 1.5 %
BILIRUB SERPL-MCNC: 0.5 MG/DL (ref 0.2–1.2)
BILIRUB UR QL STRIP: NEGATIVE
BUN SERPL-MCNC: 19 MG/DL (ref 7–25)
BUN/CREAT SERPL: 14 (CALC) (ref 6–22)
CALCIUM SERPL-MCNC: 9.1 MG/DL (ref 8.6–10.3)
CHLORIDE SERPL-SCNC: 99 MMOL/L (ref 98–110)
CHOLEST SERPL-MCNC: 232 MG/DL
CHOLEST/HDLC SERPL: 5.5 (CALC)
CO2 SERPL-SCNC: 29 MMOL/L (ref 20–32)
COLOR UR: YELLOW
CREAT SERPL-MCNC: 1.37 MG/DL (ref 0.7–1.18)
CREAT UR-MCNC: 136 MG/DL (ref 20–320)
EOSINOPHIL # BLD AUTO: 214 CELLS/UL (ref 15–500)
EOSINOPHIL NFR BLD AUTO: 3.1 %
ERYTHROCYTE [DISTWIDTH] IN BLOOD BY AUTOMATED COUNT: 12.7 % (ref 11–15)
GLOBULIN SER CALC-MCNC: 2.5 G/DL (CALC) (ref 1.9–3.7)
GLUCOSE SERPL-MCNC: 189 MG/DL (ref 65–139)
GLUCOSE UR QL STRIP: NEGATIVE
HCT VFR BLD AUTO: 45 % (ref 38.5–50)
HDLC SERPL-MCNC: 42 MG/DL
HGB BLD-MCNC: 15.1 G/DL (ref 13.2–17.1)
HGB UR QL STRIP: NEGATIVE
HYALINE CASTS #/AREA URNS LPF: NORMAL /LPF
KETONES UR QL STRIP: NEGATIVE
LDLC SERPL CALC-MCNC: ABNORMAL MG/DL (CALC)
LEUKOCYTE ESTERASE UR QL STRIP: NEGATIVE
LYMPHOCYTES # BLD AUTO: 2208 CELLS/UL (ref 850–3900)
LYMPHOCYTES NFR BLD AUTO: 32 %
MCH RBC QN AUTO: 31.1 PG (ref 27–33)
MCHC RBC AUTO-ENTMCNC: 33.6 G/DL (ref 32–36)
MCV RBC AUTO: 92.6 FL (ref 80–100)
MICROALBUMIN UR-MCNC: 5.6 MG/DL
MONOCYTES # BLD AUTO: 683 CELLS/UL (ref 200–950)
MONOCYTES NFR BLD AUTO: 9.9 %
NEUTROPHILS # BLD AUTO: 3692 CELLS/UL (ref 1500–7800)
NEUTROPHILS NFR BLD AUTO: 53.5 %
NITRITE UR QL STRIP: NEGATIVE
NONHDLC SERPL-MCNC: 190 MG/DL (CALC)
PH UR STRIP: 6 [PH] (ref 5–8)
PLATELET # BLD AUTO: 167 THOUSAND/UL (ref 140–400)
PMV BLD REES-ECKER: 12.4 FL (ref 7.5–12.5)
POTASSIUM SERPL-SCNC: 4.2 MMOL/L (ref 3.5–5.3)
PROT SERPL-MCNC: 6.6 G/DL (ref 6.1–8.1)
PROT UR QL STRIP: NEGATIVE
PSA SERPL-MCNC: 1.97 NG/ML
RBC # BLD AUTO: 4.86 MILLION/UL (ref 4.2–5.8)
RBC #/AREA URNS HPF: NORMAL /HPF
SODIUM SERPL-SCNC: 136 MMOL/L (ref 135–146)
SP GR UR STRIP: 1.02 (ref 1–1.03)
SQUAMOUS #/AREA URNS HPF: NORMAL /HPF
T4 FREE SERPL-MCNC: 0.6 NG/DL (ref 0.8–1.8)
TRIGL SERPL-MCNC: 440 MG/DL
TSH SERPL-ACNC: 51.96 MIU/L (ref 0.4–4.5)
WBC # BLD AUTO: 6.9 THOUSAND/UL (ref 3.8–10.8)
WBC #/AREA URNS HPF: NORMAL /HPF

## 2022-02-11 NOTE — TELEPHONE ENCOUNTER
Lets call patient and check on this. Clearly he is no longer taking this medication. If this is the case I will discuss further with him next week

## 2022-02-11 NOTE — TELEPHONE ENCOUNTER
Spoke to pt he has not been taking medication as prescribed. Pt states he has enough medication to last until upcoming appt on 2/15/22. Pt educated to take rx as prescribed. He voiced understanding. Nathen aware.

## 2022-02-15 ENCOUNTER — OFFICE VISIT (OUTPATIENT)
Dept: FAMILY MEDICINE | Facility: CLINIC | Age: 72
End: 2022-02-15
Payer: MEDICARE

## 2022-02-15 VITALS
WEIGHT: 240 LBS | DIASTOLIC BLOOD PRESSURE: 70 MMHG | HEIGHT: 71 IN | BODY MASS INDEX: 33.6 KG/M2 | SYSTOLIC BLOOD PRESSURE: 124 MMHG | OXYGEN SATURATION: 96 % | HEART RATE: 70 BPM

## 2022-02-15 DIAGNOSIS — K21.9 GASTROESOPHAGEAL REFLUX DISEASE: ICD-10-CM

## 2022-02-15 DIAGNOSIS — I10 ESSENTIAL HYPERTENSION: ICD-10-CM

## 2022-02-15 DIAGNOSIS — E78.5 HYPERLIPIDEMIA, UNSPECIFIED HYPERLIPIDEMIA TYPE: ICD-10-CM

## 2022-02-15 DIAGNOSIS — E03.9 HYPOTHYROIDISM, UNSPECIFIED TYPE: Primary | ICD-10-CM

## 2022-02-15 PROCEDURE — 99213 PR OFFICE/OUTPT VISIT, EST, LEVL III, 20-29 MIN: ICD-10-PCS | Mod: S$GLB,,, | Performed by: PHYSICIAN ASSISTANT

## 2022-02-15 PROCEDURE — 99213 OFFICE O/P EST LOW 20 MIN: CPT | Mod: S$GLB,,, | Performed by: PHYSICIAN ASSISTANT

## 2022-02-15 RX ORDER — OMEPRAZOLE 20 MG/1
20 CAPSULE, DELAYED RELEASE ORAL DAILY
Qty: 90 CAPSULE | Refills: 1 | Status: SHIPPED | OUTPATIENT
Start: 2022-02-15 | End: 2022-02-28 | Stop reason: SDUPTHER

## 2022-02-15 RX ORDER — AMLODIPINE BESYLATE 10 MG/1
10 TABLET ORAL DAILY
Qty: 90 TABLET | Refills: 1 | Status: SHIPPED | OUTPATIENT
Start: 2022-02-15 | End: 2022-08-23 | Stop reason: SDUPTHER

## 2022-02-15 RX ORDER — OLMESARTAN MEDOXOMIL 40 MG/1
40 TABLET ORAL DAILY
Qty: 90 TABLET | Refills: 0 | Status: SHIPPED | OUTPATIENT
Start: 2022-02-15 | End: 2022-05-27 | Stop reason: SDUPTHER

## 2022-02-15 RX ORDER — LEVOTHYROXINE SODIUM 200 UG/1
200 TABLET ORAL DAILY
Qty: 90 TABLET | Refills: 0 | Status: SHIPPED | OUTPATIENT
Start: 2022-02-15 | End: 2022-06-08 | Stop reason: SDUPTHER

## 2022-02-15 RX ORDER — NEBIVOLOL 10 MG/1
20 TABLET ORAL DAILY
Qty: 180 TABLET | Refills: 1 | Status: SHIPPED | OUTPATIENT
Start: 2022-02-15 | End: 2022-02-28 | Stop reason: SDUPTHER

## 2022-02-15 RX ORDER — HYDROCHLOROTHIAZIDE 25 MG/1
25 TABLET ORAL DAILY
Qty: 90 TABLET | Refills: 1 | Status: SHIPPED | OUTPATIENT
Start: 2022-02-15 | End: 2022-08-23 | Stop reason: SDUPTHER

## 2022-02-15 RX ORDER — TERAZOSIN 5 MG/1
5 CAPSULE ORAL NIGHTLY
Qty: 90 CAPSULE | Refills: 1 | Status: SHIPPED | OUTPATIENT
Start: 2022-02-15 | End: 2022-08-23 | Stop reason: SDUPTHER

## 2022-02-15 RX ORDER — ATORVASTATIN CALCIUM 40 MG/1
40 TABLET, FILM COATED ORAL DAILY
Qty: 90 TABLET | Refills: 1 | Status: SHIPPED | OUTPATIENT
Start: 2022-02-15 | End: 2022-08-23 | Stop reason: SDUPTHER

## 2022-02-15 RX ORDER — LEVOTHYROXINE SODIUM 50 UG/1
50 TABLET ORAL
Qty: 90 TABLET | Refills: 0 | Status: SHIPPED | OUTPATIENT
Start: 2022-02-15 | End: 2022-06-08 | Stop reason: SDUPTHER

## 2022-02-15 NOTE — PROGRESS NOTES
SUBJECTIVE:    Patient ID: Richy Hdz is a 71 y.o. male.    Chief Complaint: Follow-up (Follow up for hypertension and med refills//brought med bottles//tc)    Patient is a 70 y/o male here today for a routine follow up.     Labs reviewed with patient, TSH elevated at 51.96, T4 0.6, patient reports he did not take his synthroid for the past several days due to fear of running out.  He has been trying to ration these.  Discussed in the future that if he runs out of his medication he should let the office know so that we can refill his meds.  His cholesterol also appears to be significantly elevated.  This is likely due in part to significant hypothyroid.  He otherwise feels at his baseline and has no new concerns or complaints at this time.  Discussed healthy diet at length.       Telephone on 12/20/2021   Component Date Value Ref Range Status    WBC 02/09/2022 6.9  3.8 - 10.8 Thousand/uL Final    RBC 02/09/2022 4.86  4.20 - 5.80 Million/uL Final    Hemoglobin 02/09/2022 15.1  13.2 - 17.1 g/dL Final    Hematocrit 02/09/2022 45.0  38.5 - 50.0 % Final    MCV 02/09/2022 92.6  80.0 - 100.0 fL Final    MCH 02/09/2022 31.1  27.0 - 33.0 pg Final    MCHC 02/09/2022 33.6  32.0 - 36.0 g/dL Final    RDW 02/09/2022 12.7  11.0 - 15.0 % Final    Platelets 02/09/2022 167  140 - 400 Thousand/uL Final    MPV 02/09/2022 12.4  7.5 - 12.5 fL Final    Neutrophils, Abs 02/09/2022 3,692  1,500 - 7,800 cells/uL Final    Lymph # 02/09/2022 2,208  850 - 3,900 cells/uL Final    Mono # 02/09/2022 683  200 - 950 cells/uL Final    Eos # 02/09/2022 214  15 - 500 cells/uL Final    Baso # 02/09/2022 104  0 - 200 cells/uL Final    Neutrophils Relative 02/09/2022 53.5  % Final    Lymph % 02/09/2022 32.0  % Final    Mono % 02/09/2022 9.9  % Final    Eosinophil % 02/09/2022 3.1  % Final    Basophil % 02/09/2022 1.5  % Final    Glucose 02/09/2022 189* 65 - 139 mg/dL Final    BUN 02/09/2022 19  7 - 25 mg/dL Final     Creatinine 02/09/2022 1.37* 0.70 - 1.18 mg/dL Final    eGFR if non African American 02/09/2022 51* > OR = 60 mL/min/1.73m2 Final    eGFR if  02/09/2022 59* > OR = 60 mL/min/1.73m2 Final    BUN/Creatinine Ratio 02/09/2022 14  6 - 22 (calc) Final    Sodium 02/09/2022 136  135 - 146 mmol/L Final    Potassium 02/09/2022 4.2  3.5 - 5.3 mmol/L Final    Chloride 02/09/2022 99  98 - 110 mmol/L Final    CO2 02/09/2022 29  20 - 32 mmol/L Final    Calcium 02/09/2022 9.1  8.6 - 10.3 mg/dL Final    Total Protein 02/09/2022 6.6  6.1 - 8.1 g/dL Final    Albumin 02/09/2022 4.1  3.6 - 5.1 g/dL Final    Globulin, Total 02/09/2022 2.5  1.9 - 3.7 g/dL (calc) Final    Albumin/Globulin Ratio 02/09/2022 1.6  1.0 - 2.5 (calc) Final    Total Bilirubin 02/09/2022 0.5  0.2 - 1.2 mg/dL Final    Alkaline Phosphatase 02/09/2022 88  35 - 144 U/L Final    AST 02/09/2022 25  10 - 35 U/L Final    ALT 02/09/2022 35  9 - 46 U/L Final    Cholesterol 02/09/2022 232* <200 mg/dL Final    HDL 02/09/2022 42  > OR = 40 mg/dL Final    Triglycerides 02/09/2022 440* <150 mg/dL Final    LDL Cholesterol 02/09/2022   mg/dL (calc) Final    HDL/Cholesterol Ratio 02/09/2022 5.5* <5.0 (calc) Final    Non HDL Chol. (LDL+VLDL) 02/09/2022 190* <130 mg/dL (calc) Final    TSH w/reflex to FT4 02/09/2022 51.96* 0.40 - 4.50 mIU/L Final    T4, Free 02/09/2022 0.6* 0.8 - 1.8 ng/dL Final    PROSTATE SPECIFIC ANTIGEN, SCR - Q* 02/09/2022 1.97  < OR = 4.00 ng/mL Final    Color, UA 02/09/2022 YELLOW  YELLOW Final    Appearance, UA 02/09/2022 CLEAR  CLEAR Final    Specific Gravity, UA 02/09/2022 1.018  1.001 - 1.035 Final    pH, UA 02/09/2022 6.0  5.0 - 8.0 Final    Glucose, UA 02/09/2022 NEGATIVE  NEGATIVE Final    Bilirubin, UA 02/09/2022 NEGATIVE  NEGATIVE Final    Ketones, UA 02/09/2022 NEGATIVE  NEGATIVE Final    Occult Blood UA 02/09/2022 NEGATIVE  NEGATIVE Final    Protein, UA 02/09/2022 NEGATIVE  NEGATIVE Final    Nitrite,  UA 02/09/2022 NEGATIVE  NEGATIVE Final    Leukocytes, UA 02/09/2022 NEGATIVE  NEGATIVE Final    WBC Casts, UA 02/09/2022 NONE SEEN  < OR = 5 /HPF Final    RBC Casts, UA 02/09/2022 NONE SEEN  < OR = 2 /HPF Final    Squam Epithel, UA 02/09/2022 NONE SEEN  < OR = 5 /HPF Final    Bacteria, UA 02/09/2022 NONE SEEN  NONE SEEN /HPF Final    Hyaline Casts, UA 02/09/2022 NONE SEEN  NONE SEEN /LPF Final    Reflexive Urine Culture 02/09/2022    Final    Creatinine, Urine 02/09/2022 136  20 - 320 mg/dL Final    Microalb, Ur 02/09/2022 5.6  See Note: mg/dL Final    Microalb/Creat Ratio 02/09/2022 41* <30 mcg/mg creat Final       Past Medical History:   Diagnosis Date    Back pain     Cancer     skin-ear    Gout attack     Hyperlipemia     Hypertension     Thyroid disease      Past Surgical History:   Procedure Laterality Date    JOINT REPLACEMENT      bilateral shoulder    kidney stones      SKIN CANCER EXCISION  2013    x 2 to ear.    TONSILLECTOMY      VASECTOMY       No family history on file.    Marital Status:   Alcohol History:  reports current alcohol use of about 7.0 standard drinks of alcohol per week.  Tobacco History:  reports that he has been smoking cigars. He has smoked for the past 0.00 years. He has never used smokeless tobacco.  Drug History:  has no history on file for drug use.    Review of patient's allergies indicates:  No Known Allergies    Current Outpatient Medications:     amLODIPine (NORVASC) 10 MG tablet, Take 1 tablet (10 mg total) by mouth once daily., Disp: 90 tablet, Rfl: 1    atorvastatin (LIPITOR) 40 MG tablet, Take 1 tablet (40 mg total) by mouth once daily., Disp: 90 tablet, Rfl: 1    hydroCHLOROthiazide (HYDRODIURIL) 25 MG tablet, Take 1 tablet (25 mg total) by mouth once daily., Disp: 90 tablet, Rfl: 1    iron,carbonyl/ascorbic acid (VITRON-C ORAL), Take by mouth., Disp: , Rfl:     levothyroxine (SYNTHROID) 200 MCG tablet, Take 1 tablet (200 mcg total) by  "mouth once daily., Disp: 90 tablet, Rfl: 0    levothyroxine (SYNTHROID) 50 MCG tablet, Take 1 tablet (50 mcg total) by mouth before breakfast., Disp: 90 tablet, Rfl: 0    mv with min-lycopene-lutein (CENTRUM SILVER) 0.4-300-250 mg-mcg-mcg Tab, Take 1 tablet by mouth once daily., Disp: , Rfl:     nebivoloL (BYSTOLIC) 10 MG Tab, Take 2 tablets (20 mg total) by mouth once daily., Disp: 180 tablet, Rfl: 1    olmesartan (BENICAR) 40 MG tablet, Take 1 tablet (40 mg total) by mouth once daily., Disp: 90 tablet, Rfl: 0    omeprazole (PRILOSEC) 20 MG capsule, Take 1 capsule (20 mg total) by mouth once daily., Disp: 90 capsule, Rfl: 1    terazosin (HYTRIN) 5 MG capsule, Take 1 capsule (5 mg total) by mouth every evening., Disp: 90 capsule, Rfl: 1    Review of Systems   Constitutional: Negative for activity change, fatigue, fever and unexpected weight change.   HENT: Negative for congestion.    Respiratory: Negative for apnea, cough, chest tightness and shortness of breath.    Cardiovascular: Negative for chest pain and palpitations.   Gastrointestinal: Negative for abdominal distention and abdominal pain.   Genitourinary: Negative for difficulty urinating and dysuria.   Musculoskeletal: Negative for arthralgias and back pain.   Neurological: Negative for dizziness and weakness.          Objective:      Vitals:    02/15/22 1138 02/15/22 1144   BP: (!) 152/76 124/70   Pulse: 70    SpO2: 96%    Weight: 108.9 kg (240 lb)    Height: 5' 11" (1.803 m)      Physical Exam  Constitutional:       General: He is not in acute distress.     Appearance: He is well-developed and well-nourished.   HENT:      Head: Normocephalic and atraumatic.   Eyes:      Pupils: Pupils are equal, round, and reactive to light.   Neck:      Thyroid: No thyromegaly.   Cardiovascular:      Rate and Rhythm: Normal rate and regular rhythm.      Pulses: Intact distal pulses.      Heart sounds: Normal heart sounds.   Pulmonary:      Effort: Pulmonary effort " is normal.      Breath sounds: Normal breath sounds.   Abdominal:      General: Bowel sounds are normal. There is no distension.      Palpations: Abdomen is soft.      Tenderness: There is no abdominal tenderness.   Musculoskeletal:         General: Normal range of motion.      Cervical back: Normal range of motion and neck supple.   Skin:     General: Skin is warm and dry.      Findings: No erythema or rash.   Neurological:      Mental Status: He is alert and oriented to person, place, and time.      Cranial Nerves: No cranial nerve deficit.           Assessment:       1. Hypothyroidism, unspecified type    2. Essential hypertension    3. Hyperlipidemia, unspecified hyperlipidemia type    4. Essential hypertension    5. Gastroesophageal reflux disease         Plan:       Hypothyroidism, unspecified type  Comments:  TSH even higher this year than previous year.  Patient reports being off of medication for significant amount of time. Given serious elevation would like endo.  Orders:  -     levothyroxine (SYNTHROID) 200 MCG tablet; Take 1 tablet (200 mcg total) by mouth once daily.  Dispense: 90 tablet; Refill: 0  -     levothyroxine (SYNTHROID) 50 MCG tablet; Take 1 tablet (50 mcg total) by mouth before breakfast.  Dispense: 90 tablet; Refill: 0  -     Ambulatory referral/consult to Endocrinology; Future; Expected date: 02/15/2022    Essential hypertension  Comments:  pressure elevated today likely 2/2 being off medication. Will refill everything and he will come back for NV in 2 weeks to check pressure  Orders:  -     amLODIPine (NORVASC) 10 MG tablet; Take 1 tablet (10 mg total) by mouth once daily.  Dispense: 90 tablet; Refill: 1  -     hydroCHLOROthiazide (HYDRODIURIL) 25 MG tablet; Take 1 tablet (25 mg total) by mouth once daily.  Dispense: 90 tablet; Refill: 1  -     nebivoloL (BYSTOLIC) 10 MG Tab; Take 2 tablets (20 mg total) by mouth once daily.  Dispense: 180 tablet; Refill: 1  -     olmesartan (BENICAR) 40  MG tablet; Take 1 tablet (40 mg total) by mouth once daily.  Dispense: 90 tablet; Refill: 0  -     terazosin (HYTRIN) 5 MG capsule; Take 1 capsule (5 mg total) by mouth every evening.  Dispense: 90 capsule; Refill: 1    Hyperlipidemia, unspecified hyperlipidemia type  Comments:  stable, continue as is. Really watching the diet and adding exercise.  Orders:  -     atorvastatin (LIPITOR) 40 MG tablet; Take 1 tablet (40 mg total) by mouth once daily.  Dispense: 90 tablet; Refill: 1    Essential hypertension  -     amLODIPine (NORVASC) 10 MG tablet; Take 1 tablet (10 mg total) by mouth once daily.  Dispense: 90 tablet; Refill: 1  -     hydroCHLOROthiazide (HYDRODIURIL) 25 MG tablet; Take 1 tablet (25 mg total) by mouth once daily.  Dispense: 90 tablet; Refill: 1  -     nebivoloL (BYSTOLIC) 10 MG Tab; Take 2 tablets (20 mg total) by mouth once daily.  Dispense: 180 tablet; Refill: 1  -     olmesartan (BENICAR) 40 MG tablet; Take 1 tablet (40 mg total) by mouth once daily.  Dispense: 90 tablet; Refill: 0  -     terazosin (HYTRIN) 5 MG capsule; Take 1 capsule (5 mg total) by mouth every evening.  Dispense: 90 capsule; Refill: 1    Gastroesophageal reflux disease  -     omeprazole (PRILOSEC) 20 MG capsule; Take 1 capsule (20 mg total) by mouth once daily.  Dispense: 90 capsule; Refill: 1      Follow up in about 6 months (around 8/15/2022) for checkup.        2/15/2022 Adolfo Mae PA-C

## 2022-02-22 RX ORDER — ALLOPURINOL 300 MG/1
300 TABLET ORAL DAILY
Qty: 90 TABLET | Refills: 1 | Status: SHIPPED | OUTPATIENT
Start: 2022-02-22 | End: 2022-10-07 | Stop reason: SDUPTHER

## 2022-02-22 NOTE — TELEPHONE ENCOUNTER
----- Message from Fely Lakhani sent at 2/22/2022 11:39 AM CST -----  Pt calling said his Allopurinol did not get sent with his other meds after the OV. Send to [facility].  # [phone]

## 2022-02-28 DIAGNOSIS — I10 ESSENTIAL HYPERTENSION: ICD-10-CM

## 2022-02-28 DIAGNOSIS — K21.9 GASTROESOPHAGEAL REFLUX DISEASE: ICD-10-CM

## 2022-02-28 RX ORDER — NEBIVOLOL 10 MG/1
20 TABLET ORAL DAILY
Qty: 180 TABLET | Refills: 1 | Status: SHIPPED | OUTPATIENT
Start: 2022-02-28 | End: 2022-03-02 | Stop reason: SDUPTHER

## 2022-02-28 RX ORDER — OMEPRAZOLE 20 MG/1
20 CAPSULE, DELAYED RELEASE ORAL DAILY
Qty: 90 CAPSULE | Refills: 1 | Status: SHIPPED | OUTPATIENT
Start: 2022-02-28 | End: 2022-03-07 | Stop reason: SDUPTHER

## 2022-02-28 NOTE — TELEPHONE ENCOUNTER
----- Message from Fely Lakhani sent at 2/28/2022  9:51 AM CST -----  Pt calling said he was seen recently and did not get the Bystolic and Omeprazole did not get refills can we send to [facility] cb # [phone]

## 2022-03-02 DIAGNOSIS — I10 ESSENTIAL HYPERTENSION: ICD-10-CM

## 2022-03-02 RX ORDER — NEBIVOLOL 20 MG/1
20 TABLET ORAL DAILY
Qty: 90 TABLET | Refills: 1 | Status: SHIPPED | OUTPATIENT
Start: 2022-03-02 | End: 2022-03-07 | Stop reason: SDUPTHER

## 2022-03-02 NOTE — TELEPHONE ENCOUNTER
Referral has been refaxed and contact info given to pt to call Dr. Lin's office. rx set up to sign.    There are no Wet Read(s) to document.

## 2022-03-02 NOTE — TELEPHONE ENCOUNTER
----- Message from Fely Lesvia sent at 3/2/2022  9:13 AM CST -----  Pt calling said he saw Nathen last week and his Bystolic 20 mg said we ordered the two 10 mg so he needs a new script for the 20's to [facility] ins will not cover the 10 mg BID due to qty limits. He also has not heard from whatever specialist we referred him to. Cb # [phone]

## 2022-03-07 DIAGNOSIS — I10 ESSENTIAL HYPERTENSION: ICD-10-CM

## 2022-03-07 DIAGNOSIS — K21.9 GASTROESOPHAGEAL REFLUX DISEASE: ICD-10-CM

## 2022-03-07 RX ORDER — OMEPRAZOLE 20 MG/1
20 CAPSULE, DELAYED RELEASE ORAL DAILY
Qty: 90 CAPSULE | Refills: 1 | Status: SHIPPED | OUTPATIENT
Start: 2022-03-07 | End: 2022-10-07 | Stop reason: SDUPTHER

## 2022-03-07 RX ORDER — NEBIVOLOL 20 MG/1
20 TABLET ORAL DAILY
Qty: 90 TABLET | Refills: 1 | Status: SHIPPED | OUTPATIENT
Start: 2022-03-07 | End: 2022-10-07 | Stop reason: SDUPTHER

## 2022-03-07 NOTE — TELEPHONE ENCOUNTER
----- Message from Dottie Hatch sent at 3/7/2022  9:40 AM CST -----  Vm- patient is mad that we keep sending in the wrong dose of his medications to the wrong pharmacy. He uses [facility] and not [facility]. Voicemail also forwarded to anastacio

## 2022-03-18 ENCOUNTER — TELEPHONE (OUTPATIENT)
Dept: FAMILY MEDICINE | Facility: CLINIC | Age: 72
End: 2022-03-18
Payer: MEDICARE

## 2022-03-18 NOTE — TELEPHONE ENCOUNTER
Spoke to pt. Generic just came out 1 month ago. Usually pharmacy will fill generic vs name brand r/t cost. Pt voiced understanding. Pt is upset that generic was not cheaper. Advised pt I am unable to tell him why generic was the exact same price as name brand. Advised pt he can call pharmacy or insurance to discuss this. He voiced understanding.

## 2022-03-18 NOTE — TELEPHONE ENCOUNTER
----- Message from Felymarkie Lakhani sent at 3/18/2022  9:52 AM CDT -----  Pt calling said he called earlier this morning about a script said he has been taking Bystolic brand and his rx was filled as the generic Nebivolol.  He wants to know who authorized to change to generic and why was he charged the same as Brand. He is hesitant to try it but is willing he is more concerned that the pricing is the same.  # [phone]

## 2022-04-01 ENCOUNTER — TELEPHONE (OUTPATIENT)
Dept: FAMILY MEDICINE | Facility: CLINIC | Age: 72
End: 2022-04-01
Payer: MEDICARE

## 2022-04-01 NOTE — TELEPHONE ENCOUNTER
----- Message from Nevaeh Myers sent at 4/1/2022 10:54 AM CDT -----  Patient called and stated that it has been over four weeks and he has not heard from the doctor he referred him to please give him a call at [phone]

## 2022-04-01 NOTE — TELEPHONE ENCOUNTER
Referral has been refaxed pt does not have access to his portal. He will call back to get Dr. Lin's number.

## 2022-04-13 NOTE — PATIENT INSTRUCTIONS
ANTICOAGULATION  MANAGEMENT: Discharge Review    Pee Ayala chart reviewed for anticoagulation continuity of care    Hospital Admission on 4/8-4/12 for hip and rib fractures following fall, ORIF of greater trochanter    Discharge disposition: TCU    Results:    Recent labs: (last 7 days)     04/08/22  1811 04/09/22  0510 04/09/22  0924 04/10/22  0545 04/11/22  0518 04/12/22  0507 04/13/22  0530   INR 2.13* 1.62* 1.40* 1.20* 1.22* 1.59* 1.79*     Anticoagulation inpatient management:     reversed and held warfarin for surgery and resumed on 4/10    Anticoagulation discharge instructions:     Warfarin dosing: TCU to manage   Bridging: No   INR goal change: No      Medication changes affecting anticoagulation: No    Additional factors affecting anticoagulation: No    Plan     No adjustment to anticoagulation plan needed    ACC will resume monitoring upon discharge from TCU    Anticoagulation Calendar updated    Kendal Muniz RN   Patient Education       Hypothyroidism (Underactive Thyroid)   The Basics   Written by the doctors and editors at [facility]   What is hypothyroidism? -- Hypothyroidism is a condition that makes you feel tired.  There is a gland in your neck called the thyroid gland. It makes thyroid hormone. This hormone controls how the body uses and stores energy (figure 1).  Hypothyroidism is the medical term for when a person does not make enough thyroid hormone. People sometimes confuse this condition with HYPERthyroidism, which is when a person makes too much thyroid hormone.  What are the symptoms of hypothyroidism? -- Some people with hypothyroidism have no symptoms. But most people feel tired. That can make the condition hard to diagnose, because a lot of conditions can make you tired.  Other symptoms of hypothyroidism include:  · Lack of energy  · Getting cold easily  · Developing coarse or thin hair  · Getting constipated (having too few bowel movements)  If it is not treated, hypothyroidism can also weaken and slow your heart. This can make you feel out of breath or tired when you exercise and cause swelling (fluid buildup) in your ankles. Untreated hypothyroidism can also increase your blood pressure and raise your cholesterol - both of which increase the risk of heart trouble.  In women, hypothyroidism can disrupt monthly periods. It can also make it hard to get pregnant. In women who do get pregnant, hypothyroidism can cause problems. For instance, it can increase the chances of having a miscarriage. (A miscarriage is when a pregnancy ends on its own before the woman has been pregnant for 20 weeks.)  Is there a test for hypothyroidism? -- Yes. Your doctor or nurse can test you for hypothyroidism using a simple blood test.  How is hypothyroidism treated? -- Treatment for hypothyroidism involves taking thyroid hormone pills every day. After you take the pills for about 6 weeks, your doctor or nurse will test your blood  to make sure the levels are where they should be. They might adjust your dose depending on the results. Most people with hypothyroidism need to be on thyroid pills for the rest of their life.  Thyroid hormone pills come in different brand name and generic forms. All the pills work equally well. But you should not switch from one generic or brand name to another. Switching between pills can cause your levels to go up and down.  Never change your dose of thyroid hormone on your own. Taking too much thyroid hormone can cause heart rhythm problems and even damage your bones.  What if I want to get pregnant? -- You can try to get pregnant. Many women with hypothyroidism have healthy pregnancies. But your doctor or nurse will most likely need to change your dose of thyroid hormone once you are pregnant. That's because you need more thyroid hormone during pregnancy. They will also measure your levels of thyroid hormone 4 weeks after any change in your dose, and at least once during each trimester of pregnancy.  All topics are updated as new evidence becomes available and our peer review process is complete.  This topic retrieved from [employer] on: Sep 21, 2021.  Topic 33728 Version 8.0  Release: 29.4.2 - C29.263  © 2021 UpToDate, Inc. and/or its affiliates. All rights reserved.  figure 1: Thyroid and parathyroid glands     The thyroid is a butterfly-shaped gland in the middle of the neck. It sits just below the larynx (voice box). The thyroid makes two hormones, called T3 and T4, which control how the body uses and stores energy. The parathyroid glands are four small glands behind the thyroid. They make a hormone called parathyroid hormone, which helps control the amount of calcium in the blood.  Graphic 84098 Version 9.0    Consumer Information Use and Disclaimer   This information is not specific medical advice and does not replace information you receive from your health care provider. This is only a brief summary of  general information. It does NOT include all information about conditions, illnesses, injuries, tests, procedures, treatments, therapies, discharge instructions or life-style choices that may apply to you. You must talk with your health care provider for complete information about your health and treatment options. This information should not be used to decide whether or not to accept your health care provider's advice, instructions or recommendations. Only your health care provider has the knowledge and training to provide advice that is right for you. The use of this information is governed by the [employer] End User License Agreement, available at https://www.HelpHub/en/solutions/[employer]/about/supriya.The use of [employer] content is governed by the [employer] Terms of Use. ©2021 UpToDate, Inc. All rights reserved.  Copyright   © 2021 UpToDate, Inc. and/or its affiliates. All rights reserved.  Patient Education       Heart Healthy Diet   General   With a heart healthy food plan, you will learn to make better food choices. This diet may help you lower your blood cholesterol level, manage your blood pressure, and lower your risk for heart problems. Smaller portions may also be helpful.  Sodium is a type of mineral found in many foods. It helps keep the balance of fluids in your body. Too much sodium can raise your blood pressure. It can also make you take on extra water. This is called edema. Pay careful attention to how much salt or sodium is in your food. You may need to avoid salt or eat foods with less sodium.  Cholesterol is a fat-like, waxy substance in your blood. It is normal to have some cholesterol in your blood because your body makes it. You also get extra cholesterol from all animal products. These are foods like meats, eggs, and dairy products. Too much cholesterol in your blood can block or damage your blood vessels. This can lead to a heart attack or stroke.  Fats in your food have calories which  give energy. Not all fats are bad. Some fats are healthy, like the fat found in fish, nuts, and olive oil. These are called unsaturated fats. They help manage body functions and lower cholesterol levels. Learn about the best fats to use in your diet and where to use them. Eating too much fat may make you more likely to weigh more than is healthy. This raises your risk of many heart problems.  Fiber is found in plants. Meat and dairy products do not have fiber in them. Fiber can help you lower your unhealthy cholesterol level. You may need more water as you eat more fiber so you do not get hard stools.  What lifestyle changes are needed?   Eat a healthy diet and workout often. Try to use as many calories as you take in each day.  What changes to diet are needed?   · Eat oily fish at least 2 times a week. These are fish like tuna, salmon, and mackerel.  · Limit sodium to no more than 2,300 mg of sodium per day. This is about 1 teaspoon (5 grams) of table salt. Use little or no salt when making food. Try other spices or seasoning instead.  · Limit how much cholesterol you eat to less than 300 mg per day. You can do this by having lean meats. Also eat lots of fruits, vegetables, and fat-free and low-fat dairy products.  · Limit how much trans fats you eat. Trans fats are found in many processed foods like stick margarine, shortening, and some fried foods. Also, lower how much hydrogenated fats you eat. They are used to make pastries, biscuits, cookies, crackers, chips, and many snack foods.  · Have no more than 1 drink per day of beer, wine, and mixed drinks (alcohol).  Who should use this diet?   A heart healthy diet is good for everyone.  What foods are good to eat?   · Grains: Try to eat 6 to 8 servings of whole grain, high fiber foods each day. These are whole grain bread, cereals, brown rice, or pasta.  · Fruits and vegetables: Eat 4 to 5 servings each day. Try to pick many kinds and colors. Try to eat more that  are fresh or frozen. Look for low sodium or salt-free if you choose canned. Rinse canned items before cooking or eating. Dried peas, beans, and lentils are also good.  · Dairy: Choose low fat (1%) or fat-free milk. Eat nonfat or low-fat products.  · Protein: Try to eat more low fat or lean meats like chicken and turkey. Eat less red meat and eat more fish, eggs, egg whites, and beans instead.  · Fats: Use good fats found in fish, nuts, and avocados. Try using olive oil, canola oil, and low-sodium and low-fat salad dressing and mayonnaise. Use corn, safflower, sunflower, and soybean oils.  · Condiments: Use low-sodium or salt-free broths, soups, soy sauce, and condiments. Pepper, herbs, spices, vinegar, lemon or lime juices are great for seasoning. Sugar, cocoa powder, honey, syrup, and jams may be eaten in small amounts.  · Sweets: Low-fat, trans fat-free cookies, cakes, and pies; sammie crackers; animal crackers; low-fat fig bars; and maame snaps.     What foods should be limited or avoided?   · Grains: Salted breads, rolls, crackers, quick breads, self-rising flours, biscuit mixes, regular bread crumbs, instant hot cereals, commercially-prepared rice, pasta, stuffing mixes  · Fruits and vegetables: Commercially-prepared potatoes and vegetable mixes, regular canned vegetables and juices, vegetables frozen with sauce or pickled vegetables, processed fruits with added sugar or salt  · Dairy: Whole milk, malted milk, chocolate milk, buttermilk  · Protein: Smoked, cured, salted, or canned meat, fish, or poultry such as rodriguez and sausages  · Fats: Cut back on solid fats like butter, lard, and margarine.  · Condiments and snacks: Salted and canned peas, beans, and olives; salted snack foods; fried foods; soda, juices, or other sweetened drinks; commercially-softened water. Miso, salsa, ketchup, barbecue sauce, Worcestershire sauce, soy sauce, and teriyaki sauce are also high in salt.  · Sweets: High-fat baked goods such  as muffins, donuts, pastries, commercial baked goods  Helpful tips   · When you go to a grocery store, have a list or a meal plan. Do not shop when you are hungry to avoid cravings for foods.  · You need to know about the sodium and fat content of the food you eat. Read food labels with care. They will show you how much of each is in a serving. This amount is given as a percentage of the total amount you need each day. Reading the labels will help you make healthy food choices.     · Avoid fast foods.  · Watch your portions when eating out. Split an order or bring home half for another meal.     · Talk to a dietitian for help.  Where can I learn more?   American Academy of Family Physicians  https://familydoctor.org/diet-and-exercise-for-a-healthy-heart/   American Heart Association  https://www.heart.org/en/healthy-living/healthy-eating/eat-smart/nutrition-basics/aha-diet-and-lifestyle-recommendations   [facility]  https://www.nhs.uk/live-well/eat-well/   Last Reviewed Date   2020-03-27  Consumer Information Use and Disclaimer   This information is not specific medical advice and does not replace information you receive from your health care provider. This is only a brief summary of general information. It does NOT include all information about conditions, illnesses, injuries, tests, procedures, treatments, therapies, discharge instructions or life-style choices that may apply to you. You must talk with your health care provider for complete information about your health and treatment options. This information should not be used to decide whether or not to accept your health care providers advice, instructions or recommendations. Only your health care provider has the knowledge and training to provide advice that is right for you.  Copyright   Copyright © 2021 UpToDate, Inc. and its affiliates and/or licensors. All rights reserved.

## 2022-04-26 ENCOUNTER — TELEPHONE (OUTPATIENT)
Dept: FAMILY MEDICINE | Facility: CLINIC | Age: 72
End: 2022-04-26

## 2022-04-26 DIAGNOSIS — E03.9 HYPOTHYROIDISM, UNSPECIFIED TYPE: Primary | ICD-10-CM

## 2022-04-26 NOTE — TELEPHONE ENCOUNTER
Patient says he has not heard from Dr. Lin.  Says we have not called him back and neither has Dr. Lin.  Please follow up with him and make sure he gets scheduled.  He does not want us to make the appointment for him.   He is angry.

## 2022-04-26 NOTE — TELEPHONE ENCOUNTER
Spoke to pt. Dr. Lin's next available appointment is at the end of July. Pt would like a referral to a different endocrinologist.Advised pt Nathen is out of the office today.  Please add provider.

## 2022-04-26 NOTE — TELEPHONE ENCOUNTER
Spoke to pt. He is upset I did go over what happened on 4/01/22 that he discuss with me that he would call back for the phone number. Advised pt that it must have been a miscommunication between us. Will set reminder to check to see if pt has scheduled with Dr. Kiser.

## 2022-05-27 DIAGNOSIS — I10 ESSENTIAL HYPERTENSION: ICD-10-CM

## 2022-05-27 RX ORDER — OLMESARTAN MEDOXOMIL 40 MG/1
40 TABLET ORAL DAILY
Qty: 90 TABLET | Refills: 0 | Status: SHIPPED | OUTPATIENT
Start: 2022-05-27 | End: 2022-11-16 | Stop reason: SDUPTHER

## 2022-05-27 NOTE — TELEPHONE ENCOUNTER
----- Message from Robles Bhatti MA sent at 5/27/2022 10:26 AM CDT -----  Pt calling for a refill on his olmesartan. # [phone]

## 2022-06-08 DIAGNOSIS — E03.9 HYPOTHYROIDISM, UNSPECIFIED TYPE: ICD-10-CM

## 2022-06-08 RX ORDER — LEVOTHYROXINE SODIUM 50 UG/1
50 TABLET ORAL
Qty: 90 TABLET | Refills: 1 | Status: SHIPPED | OUTPATIENT
Start: 2022-06-08 | End: 2022-08-23

## 2022-06-08 RX ORDER — LEVOTHYROXINE SODIUM 200 UG/1
200 TABLET ORAL DAILY
Qty: 90 TABLET | Refills: 1 | Status: SHIPPED | OUTPATIENT
Start: 2022-06-08 | End: 2022-11-04 | Stop reason: SDUPTHER

## 2022-06-08 NOTE — TELEPHONE ENCOUNTER
----- Message from Nevaeh Myers sent at 6/8/2022 10:00 AM CDT -----  Patient called and stated that he need a refill of his levothyroxine 200 and 50 MCG called into [facility] on [address] if any questions please give him a call at [phone]

## 2022-07-11 ENCOUNTER — OFFICE VISIT (OUTPATIENT)
Dept: ENDOCRINOLOGY | Facility: CLINIC | Age: 72
End: 2022-07-11
Payer: MEDICARE

## 2022-07-11 VITALS
HEART RATE: 61 BPM | TEMPERATURE: 98 F | DIASTOLIC BLOOD PRESSURE: 70 MMHG | WEIGHT: 226.5 LBS | HEIGHT: 71 IN | SYSTOLIC BLOOD PRESSURE: 144 MMHG | OXYGEN SATURATION: 96 % | BODY MASS INDEX: 31.71 KG/M2

## 2022-07-11 DIAGNOSIS — R73.9 HYPERGLYCEMIA: ICD-10-CM

## 2022-07-11 DIAGNOSIS — E03.9 HYPOTHYROIDISM, UNSPECIFIED TYPE: Primary | ICD-10-CM

## 2022-07-11 PROCEDURE — 1126F AMNT PAIN NOTED NONE PRSNT: CPT | Mod: CPTII,S$GLB,, | Performed by: PHYSICIAN ASSISTANT

## 2022-07-11 PROCEDURE — 1159F MED LIST DOCD IN RCRD: CPT | Mod: CPTII,S$GLB,, | Performed by: PHYSICIAN ASSISTANT

## 2022-07-11 PROCEDURE — 99204 OFFICE O/P NEW MOD 45 MIN: CPT | Mod: S$GLB,,, | Performed by: PHYSICIAN ASSISTANT

## 2022-07-11 PROCEDURE — 1160F PR REVIEW ALL MEDS BY PRESCRIBER/CLIN PHARMACIST DOCUMENTED: ICD-10-PCS | Mod: CPTII,S$GLB,, | Performed by: PHYSICIAN ASSISTANT

## 2022-07-11 PROCEDURE — 3078F DIAST BP <80 MM HG: CPT | Mod: CPTII,S$GLB,, | Performed by: PHYSICIAN ASSISTANT

## 2022-07-11 PROCEDURE — 1126F PR PAIN SEVERITY QUANTIFIED, NO PAIN PRESENT: ICD-10-PCS | Mod: CPTII,S$GLB,, | Performed by: PHYSICIAN ASSISTANT

## 2022-07-11 PROCEDURE — 3066F PR DOCUMENTATION OF TREATMENT FOR NEPHROPATHY: ICD-10-PCS | Mod: CPTII,S$GLB,, | Performed by: PHYSICIAN ASSISTANT

## 2022-07-11 PROCEDURE — 3078F PR MOST RECENT DIASTOLIC BLOOD PRESSURE < 80 MM HG: ICD-10-PCS | Mod: CPTII,S$GLB,, | Performed by: PHYSICIAN ASSISTANT

## 2022-07-11 PROCEDURE — 4010F ACE/ARB THERAPY RXD/TAKEN: CPT | Mod: CPTII,S$GLB,, | Performed by: PHYSICIAN ASSISTANT

## 2022-07-11 PROCEDURE — 99999 PR PBB SHADOW E&M-EST. PATIENT-LVL IV: ICD-10-PCS | Mod: PBBFAC,,, | Performed by: PHYSICIAN ASSISTANT

## 2022-07-11 PROCEDURE — 1159F PR MEDICATION LIST DOCUMENTED IN MEDICAL RECORD: ICD-10-PCS | Mod: CPTII,S$GLB,, | Performed by: PHYSICIAN ASSISTANT

## 2022-07-11 PROCEDURE — 3008F BODY MASS INDEX DOCD: CPT | Mod: CPTII,S$GLB,, | Performed by: PHYSICIAN ASSISTANT

## 2022-07-11 PROCEDURE — 3008F PR BODY MASS INDEX (BMI) DOCUMENTED: ICD-10-PCS | Mod: CPTII,S$GLB,, | Performed by: PHYSICIAN ASSISTANT

## 2022-07-11 PROCEDURE — 3060F PR POS MICROALBUMINURIA RESULT DOCUMENTED/REVIEW: ICD-10-PCS | Mod: CPTII,S$GLB,, | Performed by: PHYSICIAN ASSISTANT

## 2022-07-11 PROCEDURE — 1101F PT FALLS ASSESS-DOCD LE1/YR: CPT | Mod: CPTII,S$GLB,, | Performed by: PHYSICIAN ASSISTANT

## 2022-07-11 PROCEDURE — 3077F SYST BP >= 140 MM HG: CPT | Mod: CPTII,S$GLB,, | Performed by: PHYSICIAN ASSISTANT

## 2022-07-11 PROCEDURE — 99204 PR OFFICE/OUTPT VISIT, NEW, LEVL IV, 45-59 MIN: ICD-10-PCS | Mod: S$GLB,,, | Performed by: PHYSICIAN ASSISTANT

## 2022-07-11 PROCEDURE — 4010F PR ACE/ARB THEARPY RXD/TAKEN: ICD-10-PCS | Mod: CPTII,S$GLB,, | Performed by: PHYSICIAN ASSISTANT

## 2022-07-11 PROCEDURE — 1160F RVW MEDS BY RX/DR IN RCRD: CPT | Mod: CPTII,S$GLB,, | Performed by: PHYSICIAN ASSISTANT

## 2022-07-11 PROCEDURE — 3077F PR MOST RECENT SYSTOLIC BLOOD PRESSURE >= 140 MM HG: ICD-10-PCS | Mod: CPTII,S$GLB,, | Performed by: PHYSICIAN ASSISTANT

## 2022-07-11 PROCEDURE — 99999 PR PBB SHADOW E&M-EST. PATIENT-LVL IV: CPT | Mod: PBBFAC,,, | Performed by: PHYSICIAN ASSISTANT

## 2022-07-11 PROCEDURE — 3288F PR FALLS RISK ASSESSMENT DOCUMENTED: ICD-10-PCS | Mod: CPTII,S$GLB,, | Performed by: PHYSICIAN ASSISTANT

## 2022-07-11 PROCEDURE — 3066F NEPHROPATHY DOC TX: CPT | Mod: CPTII,S$GLB,, | Performed by: PHYSICIAN ASSISTANT

## 2022-07-11 PROCEDURE — 3288F FALL RISK ASSESSMENT DOCD: CPT | Mod: CPTII,S$GLB,, | Performed by: PHYSICIAN ASSISTANT

## 2022-07-11 PROCEDURE — 3060F POS MICROALBUMINURIA REV: CPT | Mod: CPTII,S$GLB,, | Performed by: PHYSICIAN ASSISTANT

## 2022-07-11 PROCEDURE — 1101F PR PT FALLS ASSESS DOC 0-1 FALLS W/OUT INJ PAST YR: ICD-10-PCS | Mod: CPTII,S$GLB,, | Performed by: PHYSICIAN ASSISTANT

## 2022-07-11 NOTE — PROGRESS NOTES
"CC: Hypothyroidism    HPI: Richy Hdz is a 71 y.o. male here for hypothyroidism along with pending conditions listed in the Visit Diagnosis. New to endocrine. Diagnosed in 4 years ago. He thinks his daughter may have had her thyroid removed but he is not sure. Taking 250 mcg qd with all other medication and iron.    Diet is low in seafood, soy and cabbage. No hx of neck radiation. [place] the [place]. Never had a thyroid u/s.  + diarrhea, brittle nails  No fatigue, palpitations, constipation, insomnia.    A1c in 2019 was 6.8% under media. Pt states he was heavier at this time.    PMHx, PSHx: reviewed in epic.  Social Hx: Drinks 2 cocktails weekly. Smoked 4-5 years but quit.     Wt Readings from Last 20 Encounters:   07/11/22 102.7 kg (226 lb 8.4 oz)   02/15/22 108.9 kg (240 lb)   07/08/21 107 kg (236 lb)   11/05/20 104.3 kg (230 lb)   10/06/20 104.3 kg (230 lb)   09/08/20 103.9 kg (229 lb)   06/09/20 102.1 kg (225 lb)   12/03/19 107 kg (235 lb 12.8 oz)   02/12/16 103 kg (227 lb)   12/23/15 103 kg (227 lb)   04/30/14 99.8 kg (220 lb)   04/08/14 99.8 kg (220 lb)   03/11/14 99.8 kg (220 lb)   06/05/13 97.5 kg (215 lb)   05/16/13 97.5 kg (215 lb)      ROS:   Constitutional: + wt loss 14 lbs  Eyes: No recent visual changes  Cardiovascular: Denies current anginal symptoms  Respiratory: Denies current respiratory difficulty  Gastrointestinal: Denies recent bowel disturbances  GenitoUrinary - No dysuria  Skin: No new skin rash  Neurologic: No focal neurologic complaints  Musculoskeletal: no joint pain  Endocrine: no polyphagia, polydipsia or polyuria  Remainder ROS negative     BP (!) 144/70 (BP Location: Left arm, Patient Position: Sitting, BP Method: Small (Manual))   Pulse 61   Temp 98 °F (36.7 °C) (Oral)   Ht 5' 11" (1.803 m)   Wt 102.7 kg (226 lb 8.4 oz)   SpO2 96%   BMI 31.59 kg/m²      Personally reviewed labs below:    Lab Results   Component Value Date    TSH 8.85 (H) 10/05/2020        Chemistry      "   Component Value Date/Time     02/09/2022 1527    K 4.2 02/09/2022 1527    CL 99 02/09/2022 1527    CO2 29 02/09/2022 1527    BUN 19 02/09/2022 1527    CREATININE 1.37 (H) 02/09/2022 1527     (H) 02/09/2022 1527        Component Value Date/Time    CALCIUM 9.1 02/09/2022 1527    ALKPHOS 87 06/09/2020 1117    AST 25 02/09/2022 1527    ALT 35 02/09/2022 1527    BILITOT 0.5 02/09/2022 1527    ESTGFRAFRICA 59 (L) 02/09/2022 1527    EGFRNONAA 51 (L) 02/09/2022 1527           No results found for: HGBA1C     PE:  GENERAL: elderly male, well developed, well nourished  NECK: Supple neck, normal thyroid. No bruit  LYMPHATIC: No cervical or supraclavicular lymphadenopathy  CARDIOVASCULAR: Normal heart sounds, no pedal edema  RESPIRATORY: Normal effort, clear to auscultation  MUSC: 2+ DTR UE/LE  NEURO: steady gait, CN ll-Xll grossly intact  PSYCH: normal mood and affect    Assessment/Plan:   1. Hypothyroidism, unspecified type  T4, Free    TSH    TSH    T4, Free   2. Hyperglycemia  Hemoglobin A1C     Hypothyroidism-TSH has been elevated. Take Levothyroxine 30 min before your other medications. Take Vitron-C at night.  Recheck in 3 weeks. Declines thyroid u/s screening.  Hyperglycemia-recheck a1c.     Labs in three weeks  F/u in 6 mths w/ tsh, t4

## 2022-08-04 LAB
HBA1C MFR BLD: 8.2 % OF TOTAL HGB
T4 FREE SERPL-MCNC: 2.3 NG/DL (ref 0.8–1.8)
TSH SERPL-ACNC: 0.01 MIU/L (ref 0.4–4.5)

## 2022-08-09 ENCOUNTER — OFFICE VISIT (OUTPATIENT)
Dept: FAMILY MEDICINE | Facility: CLINIC | Age: 72
End: 2022-08-09
Payer: MEDICARE

## 2022-08-09 VITALS
DIASTOLIC BLOOD PRESSURE: 70 MMHG | RESPIRATION RATE: 19 BRPM | HEART RATE: 58 BPM | SYSTOLIC BLOOD PRESSURE: 160 MMHG | OXYGEN SATURATION: 95 % | WEIGHT: 226 LBS | BODY MASS INDEX: 31.52 KG/M2

## 2022-08-09 DIAGNOSIS — E11.9 TYPE 2 DIABETES MELLITUS WITHOUT COMPLICATION, WITHOUT LONG-TERM CURRENT USE OF INSULIN: ICD-10-CM

## 2022-08-09 DIAGNOSIS — E03.9 HYPOTHYROIDISM, UNSPECIFIED TYPE: Primary | ICD-10-CM

## 2022-08-09 DIAGNOSIS — I10 ESSENTIAL HYPERTENSION: ICD-10-CM

## 2022-08-09 DIAGNOSIS — E78.5 HYPERLIPIDEMIA, UNSPECIFIED HYPERLIPIDEMIA TYPE: ICD-10-CM

## 2022-08-09 PROCEDURE — 3077F SYST BP >= 140 MM HG: CPT | Mod: CPTII,S$GLB,, | Performed by: PHYSICIAN ASSISTANT

## 2022-08-09 PROCEDURE — 3008F BODY MASS INDEX DOCD: CPT | Mod: CPTII,S$GLB,, | Performed by: PHYSICIAN ASSISTANT

## 2022-08-09 PROCEDURE — 3060F PR POS MICROALBUMINURIA RESULT DOCUMENTED/REVIEW: ICD-10-PCS | Mod: CPTII,S$GLB,, | Performed by: PHYSICIAN ASSISTANT

## 2022-08-09 PROCEDURE — 1159F MED LIST DOCD IN RCRD: CPT | Mod: CPTII,S$GLB,, | Performed by: PHYSICIAN ASSISTANT

## 2022-08-09 PROCEDURE — 3008F PR BODY MASS INDEX (BMI) DOCUMENTED: ICD-10-PCS | Mod: CPTII,S$GLB,, | Performed by: PHYSICIAN ASSISTANT

## 2022-08-09 PROCEDURE — 4010F ACE/ARB THERAPY RXD/TAKEN: CPT | Mod: CPTII,S$GLB,, | Performed by: PHYSICIAN ASSISTANT

## 2022-08-09 PROCEDURE — 1126F PR PAIN SEVERITY QUANTIFIED, NO PAIN PRESENT: ICD-10-PCS | Mod: CPTII,S$GLB,, | Performed by: PHYSICIAN ASSISTANT

## 2022-08-09 PROCEDURE — 3288F FALL RISK ASSESSMENT DOCD: CPT | Mod: CPTII,S$GLB,, | Performed by: PHYSICIAN ASSISTANT

## 2022-08-09 PROCEDURE — 3052F HG A1C>EQUAL 8.0%<EQUAL 9.0%: CPT | Mod: CPTII,S$GLB,, | Performed by: PHYSICIAN ASSISTANT

## 2022-08-09 PROCEDURE — 4010F PR ACE/ARB THEARPY RXD/TAKEN: ICD-10-PCS | Mod: CPTII,S$GLB,, | Performed by: PHYSICIAN ASSISTANT

## 2022-08-09 PROCEDURE — 3077F PR MOST RECENT SYSTOLIC BLOOD PRESSURE >= 140 MM HG: ICD-10-PCS | Mod: CPTII,S$GLB,, | Performed by: PHYSICIAN ASSISTANT

## 2022-08-09 PROCEDURE — 1126F AMNT PAIN NOTED NONE PRSNT: CPT | Mod: CPTII,S$GLB,, | Performed by: PHYSICIAN ASSISTANT

## 2022-08-09 PROCEDURE — 3078F PR MOST RECENT DIASTOLIC BLOOD PRESSURE < 80 MM HG: ICD-10-PCS | Mod: CPTII,S$GLB,, | Performed by: PHYSICIAN ASSISTANT

## 2022-08-09 PROCEDURE — 3066F PR DOCUMENTATION OF TREATMENT FOR NEPHROPATHY: ICD-10-PCS | Mod: CPTII,S$GLB,, | Performed by: PHYSICIAN ASSISTANT

## 2022-08-09 PROCEDURE — 1101F PT FALLS ASSESS-DOCD LE1/YR: CPT | Mod: CPTII,S$GLB,, | Performed by: PHYSICIAN ASSISTANT

## 2022-08-09 PROCEDURE — 99214 OFFICE O/P EST MOD 30 MIN: CPT | Mod: S$GLB,,, | Performed by: PHYSICIAN ASSISTANT

## 2022-08-09 PROCEDURE — 99214 PR OFFICE/OUTPT VISIT, EST, LEVL IV, 30-39 MIN: ICD-10-PCS | Mod: S$GLB,,, | Performed by: PHYSICIAN ASSISTANT

## 2022-08-09 PROCEDURE — 3066F NEPHROPATHY DOC TX: CPT | Mod: CPTII,S$GLB,, | Performed by: PHYSICIAN ASSISTANT

## 2022-08-09 PROCEDURE — 1101F PR PT FALLS ASSESS DOC 0-1 FALLS W/OUT INJ PAST YR: ICD-10-PCS | Mod: CPTII,S$GLB,, | Performed by: PHYSICIAN ASSISTANT

## 2022-08-09 PROCEDURE — 3078F DIAST BP <80 MM HG: CPT | Mod: CPTII,S$GLB,, | Performed by: PHYSICIAN ASSISTANT

## 2022-08-09 PROCEDURE — 3052F PR MOST RECENT HEMOGLOBIN A1C LEVEL 8.0 - < 9.0%: ICD-10-PCS | Mod: CPTII,S$GLB,, | Performed by: PHYSICIAN ASSISTANT

## 2022-08-09 PROCEDURE — 3288F PR FALLS RISK ASSESSMENT DOCUMENTED: ICD-10-PCS | Mod: CPTII,S$GLB,, | Performed by: PHYSICIAN ASSISTANT

## 2022-08-09 PROCEDURE — 1159F PR MEDICATION LIST DOCUMENTED IN MEDICAL RECORD: ICD-10-PCS | Mod: CPTII,S$GLB,, | Performed by: PHYSICIAN ASSISTANT

## 2022-08-09 PROCEDURE — 3060F POS MICROALBUMINURIA REV: CPT | Mod: CPTII,S$GLB,, | Performed by: PHYSICIAN ASSISTANT

## 2022-08-09 RX ORDER — METFORMIN HYDROCHLORIDE 500 MG/1
500 TABLET, EXTENDED RELEASE ORAL 2 TIMES DAILY WITH MEALS
Qty: 60 TABLET | Refills: 2 | Status: SHIPPED | OUTPATIENT
Start: 2022-08-09 | End: 2022-11-04

## 2022-08-09 NOTE — PROGRESS NOTES
SUBJECTIVE:    Patient ID: Richy Hdz is a 71 y.o. male.    Chief Complaint: Hypothyroidism    This is a 70 yo wm presenting today in followup. He has been seen and evaluated by endo. Recent labs from Heidi Juarez with endo are available to review. TSH suppressed at .01 with FT4 at 2.3. A1c elevated to 8.2%. He reports that he has been better about taking synthroid in the morning by itself. Clearly with good compliance he is on too high of a dose here. Open to lowering to 200mcg and starting metformin for the DM.      Office Visit on 07/11/2022   Component Date Value Ref Range Status    Hemoglobin A1C 08/03/2022 8.2 (A) <5.7 % of total Hgb Final    T4, Free 08/03/2022 2.3 (A) 0.8 - 1.8 ng/dL Final    TSH 08/03/2022 0.01 (A) 0.40 - 4.50 mIU/L Final       Past Medical History:   Diagnosis Date    Back pain     Cancer     skin-ear    Gout attack     Hyperlipemia     Hypertension     Thyroid disease      Past Surgical History:   Procedure Laterality Date    JOINT REPLACEMENT      bilateral shoulder    kidney stones      SKIN CANCER EXCISION  2013    x 2 to ear.    TONSILLECTOMY      VASECTOMY       History reviewed. No pertinent family history.    Marital Status:   Alcohol History:  reports current alcohol use of about 7.0 standard drinks of alcohol per week.  Tobacco History:  reports that he has quit smoking. His smoking use included cigars. He quit after 0.00 years of use. He has never used smokeless tobacco.  Drug History:  has no history on file for drug use.    Review of patient's allergies indicates:  No Known Allergies    Current Outpatient Medications:     allopurinoL (ZYLOPRIM) 300 MG tablet, Take 1 tablet (300 mg total) by mouth once daily., Disp: 90 tablet, Rfl: 1    amLODIPine (NORVASC) 10 MG tablet, Take 1 tablet (10 mg total) by mouth once daily., Disp: 90 tablet, Rfl: 1    atorvastatin (LIPITOR) 40 MG tablet, Take 1 tablet (40 mg total) by mouth once daily., Disp: 90  tablet, Rfl: 1    hydroCHLOROthiazide (HYDRODIURIL) 25 MG tablet, Take 1 tablet (25 mg total) by mouth once daily., Disp: 90 tablet, Rfl: 1    iron,carbonyl/ascorbic acid (VITRON-C ORAL), Take by mouth. Taking at night -, Disp: , Rfl:     levothyroxine (SYNTHROID) 200 MCG tablet, Take 1 tablet (200 mcg total) by mouth once daily., Disp: 90 tablet, Rfl: 1    levothyroxine (SYNTHROID) 50 MCG tablet, Take 1 tablet (50 mcg total) by mouth before breakfast., Disp: 90 tablet, Rfl: 1    nebivoloL (BYSTOLIC) 20 mg Tab, Take 20 mg by mouth once daily., Disp: 90 tablet, Rfl: 1    olmesartan (BENICAR) 40 MG tablet, Take 1 tablet (40 mg total) by mouth once daily., Disp: 90 tablet, Rfl: 0    omeprazole (PRILOSEC) 20 MG capsule, Take 1 capsule (20 mg total) by mouth once daily., Disp: 90 capsule, Rfl: 1    terazosin (HYTRIN) 5 MG capsule, Take 1 capsule (5 mg total) by mouth every evening., Disp: 90 capsule, Rfl: 1    metFORMIN (GLUCOPHAGE-XR) 500 MG ER 24hr tablet, Take 1 tablet (500 mg total) by mouth 2 (two) times daily with meals., Disp: 60 tablet, Rfl: 2    multivit-min-FA-lycopen-lutein 0.4 mg-300 mcg- 250 mcg Tab, Take 1 tablet by mouth once daily., Disp: , Rfl:     Review of Systems   Constitutional: Negative for activity change, fatigue, fever and unexpected weight change.   HENT: Negative for congestion.    Respiratory: Negative for apnea, cough, chest tightness and shortness of breath.    Cardiovascular: Negative for chest pain and palpitations.   Gastrointestinal: Negative for abdominal distention and abdominal pain.   Genitourinary: Negative for difficulty urinating and dysuria.   Musculoskeletal: Negative for arthralgias and back pain.   Neurological: Negative for dizziness and weakness.          Objective:      Vitals:    08/09/22 1113   BP: (!) 160/70   Pulse: (!) 58   Resp: 19   SpO2: 95%   Weight: 102.5 kg (226 lb)     Physical Exam  Constitutional:       General: He is not in acute distress.      Appearance: He is well-developed.   HENT:      Head: Normocephalic and atraumatic.   Eyes:      Pupils: Pupils are equal, round, and reactive to light.   Neck:      Thyroid: No thyromegaly.   Cardiovascular:      Rate and Rhythm: Normal rate and regular rhythm.      Heart sounds: Normal heart sounds.   Pulmonary:      Effort: Pulmonary effort is normal.      Breath sounds: Normal breath sounds.   Abdominal:      General: Bowel sounds are normal. There is no distension.      Palpations: Abdomen is soft.      Tenderness: There is no abdominal tenderness.   Musculoskeletal:         General: Normal range of motion.      Cervical back: Normal range of motion and neck supple.   Skin:     General: Skin is warm and dry.      Findings: No erythema or rash.   Neurological:      Mental Status: He is alert and oriented to person, place, and time.      Cranial Nerves: No cranial nerve deficit.           Assessment:       1. Hypothyroidism, unspecified type    2. Type 2 diabetes mellitus without complication, without long-term current use of insulin    3. Essential hypertension    4. Hyperlipidemia, unspecified hyperlipidemia type         Plan:       Hypothyroidism, unspecified type  Comments:  TSH suppressed at .01. Need to lower levothyroxine to 200mcg alone. recheck tsh in 8 weeks.  Orders:  -     TSH w/reflex to FT4; Future; Expected date: 08/09/2022    Type 2 diabetes mellitus without complication, without long-term current use of insulin  Comments:  agreeable to starting metformin ER BID now. Diet changes discussed. Low carb options, exercise recommended. f/u in 3 mos to recheck a1c  Orders:  -     metFORMIN (GLUCOPHAGE-XR) 500 MG ER 24hr tablet; Take 1 tablet (500 mg total) by mouth 2 (two) times daily with meals.  Dispense: 60 tablet; Refill: 2    Essential hypertension  Comments:  Pressure not at goal but he reports excellent at home. will bring log and cuff to calibrate at NV    Hyperlipidemia, unspecified  hyperlipidemia type  Comments:  continue as is.      Follow up in about 3 months (around 11/9/2022) for Diabetic Check-Up.        8/9/2022 Adolfo Mae PA-C

## 2022-08-23 ENCOUNTER — CLINICAL SUPPORT (OUTPATIENT)
Dept: FAMILY MEDICINE | Facility: CLINIC | Age: 72
End: 2022-08-23
Payer: MEDICARE

## 2022-08-23 VITALS — DIASTOLIC BLOOD PRESSURE: 64 MMHG | SYSTOLIC BLOOD PRESSURE: 122 MMHG

## 2022-08-23 DIAGNOSIS — I10 ESSENTIAL HYPERTENSION: ICD-10-CM

## 2022-08-23 DIAGNOSIS — E78.5 HYPERLIPIDEMIA, UNSPECIFIED HYPERLIPIDEMIA TYPE: ICD-10-CM

## 2022-08-23 RX ORDER — HYDROCHLOROTHIAZIDE 25 MG/1
25 TABLET ORAL DAILY
Qty: 90 TABLET | Refills: 1 | Status: SHIPPED | OUTPATIENT
Start: 2022-08-23 | End: 2023-07-11 | Stop reason: SDUPTHER

## 2022-08-23 RX ORDER — TERAZOSIN 5 MG/1
5 CAPSULE ORAL NIGHTLY
Qty: 90 CAPSULE | Refills: 1 | Status: SHIPPED | OUTPATIENT
Start: 2022-08-23 | End: 2023-06-02 | Stop reason: SDUPTHER

## 2022-08-23 RX ORDER — AMLODIPINE BESYLATE 10 MG/1
10 TABLET ORAL DAILY
Qty: 90 TABLET | Refills: 1 | Status: SHIPPED | OUTPATIENT
Start: 2022-08-23 | End: 2023-06-02 | Stop reason: SDUPTHER

## 2022-08-23 RX ORDER — ATORVASTATIN CALCIUM 40 MG/1
40 TABLET, FILM COATED ORAL DAILY
Qty: 90 TABLET | Refills: 1 | Status: SHIPPED | OUTPATIENT
Start: 2022-08-23 | End: 2023-06-02 | Stop reason: SDUPTHER

## 2022-08-23 NOTE — PROGRESS NOTES
Patient here for Blood pressure check. Lois asking for repeat BP check. Pt home cuff reading 173/81 and 146/64 within 5 minutes of each other. Pt is asymptomatic. Pt BP now 122/64. Reviewed pt previous home readings, pt had list on phone:    8/20/22   183/82    8/22/22  214/88    8/23/22  192/76  187/80    Reviewed medication list with Pt. Med list has been reconciled. Pt taking all BP medications. Per Sudarshan, BP normal today in the office. No change in medications. Advise pt to get new BP cuff. NV with Nathen in 2 weeks. Pt notified. NV scheduled. Conformed pt follow up appt with Nathen in November. NV scheduled. Pt also requesting medication refills.

## 2022-09-06 ENCOUNTER — CLINICAL SUPPORT (OUTPATIENT)
Dept: FAMILY MEDICINE | Facility: CLINIC | Age: 72
End: 2022-09-06
Payer: MEDICARE

## 2022-09-06 VITALS — HEART RATE: 80 BPM | SYSTOLIC BLOOD PRESSURE: 140 MMHG | DIASTOLIC BLOOD PRESSURE: 60 MMHG

## 2022-09-06 NOTE — PROGRESS NOTES
Patient is maxed out on 5 blood pressure medications as is.  Pressure upper limits of normal today.  No changes.  Please have him follow-up with me in clinic as scheduled in 6 weeks

## 2022-09-06 NOTE — PROGRESS NOTES
Patient came in today for Blood pressure check     Today its 140/60, patient is taking Blood pressure medication as prescribed with no change, D/C metormin on his last OV.     Ranging in the 170's/60s - 170's/60 this morning.     Scanning in his daily BP log   Patient will continue to monitor his blood pressure, continue medications as prescribed and follow up for his November appointment with Nathen Mae   No questions at this time.

## 2022-10-07 DIAGNOSIS — K21.9 GASTROESOPHAGEAL REFLUX DISEASE: ICD-10-CM

## 2022-10-07 DIAGNOSIS — I10 ESSENTIAL HYPERTENSION: ICD-10-CM

## 2022-10-07 RX ORDER — OMEPRAZOLE 20 MG/1
20 CAPSULE, DELAYED RELEASE ORAL DAILY
Qty: 90 CAPSULE | Refills: 1 | Status: SHIPPED | OUTPATIENT
Start: 2022-10-07 | End: 2023-02-07 | Stop reason: SDUPTHER

## 2022-10-07 RX ORDER — ALLOPURINOL 300 MG/1
300 TABLET ORAL DAILY
Qty: 90 TABLET | Refills: 1 | Status: SHIPPED | OUTPATIENT
Start: 2022-10-07 | End: 2023-06-02 | Stop reason: SDUPTHER

## 2022-10-07 RX ORDER — NEBIVOLOL 20 MG/1
20 TABLET ORAL DAILY
Qty: 90 TABLET | Refills: 1 | Status: SHIPPED | OUTPATIENT
Start: 2022-10-07 | End: 2023-04-05

## 2022-10-07 NOTE — TELEPHONE ENCOUNTER
----- Message from Nevaeh Myers sent at 10/7/2022  7:20 AM CDT -----  VM 10/06/22 @ 5:20 PM : patient called and stated that he need new prescriptions for his allopurinol, nebivolol, and another medicine (was not able to understand the name and the patient did not leave any pharmacy information) please give him a call back at [phone]

## 2022-10-07 NOTE — TELEPHONE ENCOUNTER
Last ov 8/9/22  Upcoming appt 11/4/22    LMOR for a call back. Will set up meds listed in message.

## 2022-10-31 ENCOUNTER — TELEPHONE (OUTPATIENT)
Dept: FAMILY MEDICINE | Facility: CLINIC | Age: 72
End: 2022-10-31

## 2022-10-31 NOTE — PATIENT INSTRUCTIONS
Patient's FSGs are uncontrolled due to hyperglycemia on current medication regimen.  Last A1c reviewed-   Lab Results   Component Value Date    HGBA1C 8.9 (H) 10/27/2022     Most recent fingerstick glucose reviewed-   Recent Labs   Lab 10/31/22  0008 10/31/22  0526 10/31/22  1005 10/31/22  1124   POCTGLUCOSE 430* 331* 338* 402*     Current correctional scale  Low  Maintain anti-hyperglycemic dose as follows-   Antihyperglycemics (From admission, onward)    Start     Stop Route Frequency Ordered    10/31/22 2100  insulin detemir U-100 pen 8 Units         -- SubQ 2 times daily 10/31/22 1151    10/31/22 1200  insulin aspart U-100 pen 4 Units         -- SubQ 3 times daily with meals 10/31/22 1151    10/28/22 2224  insulin aspart U-100 pen 0-5 Units         -- SubQ Every 6 hours PRN 10/28/22 2125        Transition off IV insulin to subcutaneous.  On oral hypoglycemics at home.      Continue basal/bolus regimen.  Up titrate insulin as appropriate.   Take Levothyroxine 30 min before your other medications. Take Vitron-C at night.

## 2022-11-04 ENCOUNTER — OFFICE VISIT (OUTPATIENT)
Dept: FAMILY MEDICINE | Facility: CLINIC | Age: 72
End: 2022-11-04
Payer: MEDICARE

## 2022-11-04 VITALS
WEIGHT: 221 LBS | HEART RATE: 60 BPM | HEIGHT: 71 IN | BODY MASS INDEX: 30.94 KG/M2 | SYSTOLIC BLOOD PRESSURE: 138 MMHG | DIASTOLIC BLOOD PRESSURE: 70 MMHG

## 2022-11-04 DIAGNOSIS — E11.9 TYPE 2 DIABETES MELLITUS WITHOUT COMPLICATION, WITHOUT LONG-TERM CURRENT USE OF INSULIN: Primary | ICD-10-CM

## 2022-11-04 DIAGNOSIS — I10 ESSENTIAL HYPERTENSION: ICD-10-CM

## 2022-11-04 DIAGNOSIS — E03.9 HYPOTHYROIDISM, UNSPECIFIED TYPE: ICD-10-CM

## 2022-11-04 LAB
T4 FREE SERPL-MCNC: 1.4 NG/DL (ref 0.8–1.8)
TSH SERPL-ACNC: 0.01 MIU/L (ref 0.4–4.5)

## 2022-11-04 PROCEDURE — 3008F BODY MASS INDEX DOCD: CPT | Mod: CPTII,S$GLB,, | Performed by: PHYSICIAN ASSISTANT

## 2022-11-04 PROCEDURE — 4010F ACE/ARB THERAPY RXD/TAKEN: CPT | Mod: CPTII,S$GLB,, | Performed by: PHYSICIAN ASSISTANT

## 2022-11-04 PROCEDURE — 3078F PR MOST RECENT DIASTOLIC BLOOD PRESSURE < 80 MM HG: ICD-10-PCS | Mod: CPTII,S$GLB,, | Performed by: PHYSICIAN ASSISTANT

## 2022-11-04 PROCEDURE — 99214 PR OFFICE/OUTPT VISIT, EST, LEVL IV, 30-39 MIN: ICD-10-PCS | Mod: S$GLB,,, | Performed by: PHYSICIAN ASSISTANT

## 2022-11-04 PROCEDURE — 3066F PR DOCUMENTATION OF TREATMENT FOR NEPHROPATHY: ICD-10-PCS | Mod: CPTII,S$GLB,, | Performed by: PHYSICIAN ASSISTANT

## 2022-11-04 PROCEDURE — 3008F PR BODY MASS INDEX (BMI) DOCUMENTED: ICD-10-PCS | Mod: CPTII,S$GLB,, | Performed by: PHYSICIAN ASSISTANT

## 2022-11-04 PROCEDURE — 3075F PR MOST RECENT SYSTOLIC BLOOD PRESS GE 130-139MM HG: ICD-10-PCS | Mod: CPTII,S$GLB,, | Performed by: PHYSICIAN ASSISTANT

## 2022-11-04 PROCEDURE — 3060F PR POS MICROALBUMINURIA RESULT DOCUMENTED/REVIEW: ICD-10-PCS | Mod: CPTII,S$GLB,, | Performed by: PHYSICIAN ASSISTANT

## 2022-11-04 PROCEDURE — 1159F PR MEDICATION LIST DOCUMENTED IN MEDICAL RECORD: ICD-10-PCS | Mod: CPTII,S$GLB,, | Performed by: PHYSICIAN ASSISTANT

## 2022-11-04 PROCEDURE — 4010F PR ACE/ARB THEARPY RXD/TAKEN: ICD-10-PCS | Mod: CPTII,S$GLB,, | Performed by: PHYSICIAN ASSISTANT

## 2022-11-04 PROCEDURE — 3052F PR MOST RECENT HEMOGLOBIN A1C LEVEL 8.0 - < 9.0%: ICD-10-PCS | Mod: CPTII,S$GLB,, | Performed by: PHYSICIAN ASSISTANT

## 2022-11-04 PROCEDURE — 3066F NEPHROPATHY DOC TX: CPT | Mod: CPTII,S$GLB,, | Performed by: PHYSICIAN ASSISTANT

## 2022-11-04 PROCEDURE — 1159F MED LIST DOCD IN RCRD: CPT | Mod: CPTII,S$GLB,, | Performed by: PHYSICIAN ASSISTANT

## 2022-11-04 PROCEDURE — 3078F DIAST BP <80 MM HG: CPT | Mod: CPTII,S$GLB,, | Performed by: PHYSICIAN ASSISTANT

## 2022-11-04 PROCEDURE — 3075F SYST BP GE 130 - 139MM HG: CPT | Mod: CPTII,S$GLB,, | Performed by: PHYSICIAN ASSISTANT

## 2022-11-04 PROCEDURE — 3060F POS MICROALBUMINURIA REV: CPT | Mod: CPTII,S$GLB,, | Performed by: PHYSICIAN ASSISTANT

## 2022-11-04 PROCEDURE — 3052F HG A1C>EQUAL 8.0%<EQUAL 9.0%: CPT | Mod: CPTII,S$GLB,, | Performed by: PHYSICIAN ASSISTANT

## 2022-11-04 PROCEDURE — 99214 OFFICE O/P EST MOD 30 MIN: CPT | Mod: S$GLB,,, | Performed by: PHYSICIAN ASSISTANT

## 2022-11-04 RX ORDER — LEVOTHYROXINE SODIUM 175 UG/1
175 TABLET ORAL DAILY
Qty: 90 TABLET | Refills: 1 | Status: SHIPPED | OUTPATIENT
Start: 2022-11-04 | End: 2023-06-02 | Stop reason: SDUPTHER

## 2022-11-04 RX ORDER — NEBIVOLOL HYDROCHLORIDE 20 MG/1
20 TABLET ORAL DAILY
Qty: 90 TABLET | Refills: 1 | Status: SHIPPED | OUTPATIENT
Start: 2022-11-04 | End: 2023-06-02 | Stop reason: SDUPTHER

## 2022-11-04 NOTE — PROGRESS NOTES
SUBJECTIVE:    Patient ID: Richy Hdz is a 71 y.o. male.    Chief Complaint: Hypertension (No bottles, declined Flu vaccine, disccuss about medications, abc )    This is a 71-year-old male who presents today for hypertension follow-up.  Blood pressure does look significantly improved today.  He is followed by myself and Endo regarding thyroid.  Recent reduction in thyroid dose to 200 mcg has not significantly helped with TSH.  Previous A1c at 8.2% and patient was initiated on metformin. He reports that he has been doing a new diet plan called exipure. Decided against taking metformin. Never even started it. He wishes to recheck a1c now that he has really been better about the diet.       Office Visit on 08/09/2022   Component Date Value Ref Range Status    TSH w/reflex to FT4 11/03/2022 0.01 (L)  0.40 - 4.50 mIU/L Final    T4, Free 11/03/2022 1.4  0.8 - 1.8 ng/dL Final   Office Visit on 07/11/2022   Component Date Value Ref Range Status    Hemoglobin A1C 08/03/2022 8.2 (H)  <5.7 % of total Hgb Final    T4, Free 08/03/2022 2.3 (H)  0.8 - 1.8 ng/dL Final    TSH 08/03/2022 0.01 (L)  0.40 - 4.50 mIU/L Final       Past Medical History:   Diagnosis Date    Back pain     Cancer     skin-ear    Gout attack     Hyperlipemia     Hypertension     Thyroid disease      Past Surgical History:   Procedure Laterality Date    JOINT REPLACEMENT      bilateral shoulder    kidney stones      SKIN CANCER EXCISION  2013    x 2 to ear.    TONSILLECTOMY      VASECTOMY       No family history on file.    Marital Status:   Alcohol History:  reports current alcohol use of about 7.0 standard drinks per week.  Tobacco History:  reports that he has quit smoking. His smoking use included cigars. He has never used smokeless tobacco.  Drug History:  has no history on file for drug use.    Review of patient's allergies indicates:  No Known Allergies    Current Outpatient Medications:     allopurinoL (ZYLOPRIM) 300 MG tablet, Take 1  "tablet (300 mg total) by mouth once daily., Disp: 90 tablet, Rfl: 1    amLODIPine (NORVASC) 10 MG tablet, Take 1 tablet (10 mg total) by mouth once daily., Disp: 90 tablet, Rfl: 1    atorvastatin (LIPITOR) 40 MG tablet, Take 1 tablet (40 mg total) by mouth once daily., Disp: 90 tablet, Rfl: 1    hydroCHLOROthiazide (HYDRODIURIL) 25 MG tablet, Take 1 tablet (25 mg total) by mouth once daily., Disp: 90 tablet, Rfl: 1    iron,carbonyl/ascorbic acid (VITRON-C ORAL), Take by mouth. Taking at night -, Disp: , Rfl:     multivit-min-FA-lycopen-lutein 0.4 mg-300 mcg- 250 mcg Tab, Take 1 tablet by mouth once daily., Disp: , Rfl:     nebivoloL (BYSTOLIC) 20 mg Tab, Take 20 mg by mouth once daily., Disp: 90 tablet, Rfl: 1    olmesartan (BENICAR) 40 MG tablet, Take 1 tablet (40 mg total) by mouth once daily., Disp: 90 tablet, Rfl: 0    omeprazole (PRILOSEC) 20 MG capsule, Take 1 capsule (20 mg total) by mouth once daily., Disp: 90 capsule, Rfl: 1    terazosin (HYTRIN) 5 MG capsule, Take 1 capsule (5 mg total) by mouth every evening., Disp: 90 capsule, Rfl: 1    BYSTOLIC 20 mg Tab, Take 20 mg by mouth once daily., Disp: 90 tablet, Rfl: 1    levothyroxine (SYNTHROID) 175 MCG tablet, Take 1 tablet (175 mcg total) by mouth once daily., Disp: 90 tablet, Rfl: 1    Review of Systems   Constitutional:  Negative for activity change, fatigue, fever and unexpected weight change.   HENT:  Negative for congestion.    Respiratory:  Negative for apnea, cough, chest tightness and shortness of breath.    Cardiovascular:  Negative for chest pain and palpitations.   Gastrointestinal:  Negative for abdominal distention and abdominal pain.   Genitourinary:  Negative for difficulty urinating and dysuria.   Musculoskeletal:  Negative for arthralgias and back pain.   Neurological:  Negative for dizziness and weakness.        Objective:      Vitals:    11/04/22 0935   BP: 138/70   Pulse: 60   Weight: 100.2 kg (221 lb)   Height: 5' 11" (1.803 m) "     Physical Exam  Constitutional:       General: He is not in acute distress.     Appearance: He is well-developed.   HENT:      Head: Normocephalic and atraumatic.   Eyes:      Pupils: Pupils are equal, round, and reactive to light.   Neck:      Thyroid: No thyromegaly.   Cardiovascular:      Rate and Rhythm: Normal rate and regular rhythm.      Heart sounds: Normal heart sounds.   Pulmonary:      Effort: Pulmonary effort is normal.      Breath sounds: Normal breath sounds.   Abdominal:      General: Bowel sounds are normal. There is no distension.      Palpations: Abdomen is soft.      Tenderness: There is no abdominal tenderness.   Musculoskeletal:         General: Normal range of motion.      Cervical back: Normal range of motion and neck supple.   Skin:     General: Skin is warm and dry.      Findings: No erythema or rash.   Neurological:      Mental Status: He is alert and oriented to person, place, and time.      Cranial Nerves: No cranial nerve deficit.         Assessment:       1. Type 2 diabetes mellitus without complication, without long-term current use of insulin    2. Essential hypertension    3. Hypothyroidism, unspecified type         Plan:       Type 2 diabetes mellitus without complication, without long-term current use of insulin  Comments:  Not taking metformin now but on much better diet. recheck a1c now.  Orders:  -     Hemoglobin A1C; Future; Expected date: 11/04/2022    Essential hypertension  Comments:  Pressure doing better now. will continue with bystolic. Pt requests brand name only since it is same cost for him.  Orders:  -     BYSTOLIC 20 mg Tab; Take 20 mg by mouth once daily.  Dispense: 90 tablet; Refill: 1    Hypothyroidism, unspecified type  Comments:  TSH still suppressed. lower to 175mcg and recheck tsh/ft4 in 4 weeks.  Orders:  -     levothyroxine (SYNTHROID) 175 MCG tablet; Take 1 tablet (175 mcg total) by mouth once daily.  Dispense: 90 tablet; Refill: 1  -     TSH w/reflex to  FT4; Future; Expected date: 11/04/2022    Follow up in about 3 months (around 2/4/2023) for Diabetic Check-Up.        11/4/2022 Adolfo Mae PA-C

## 2022-11-16 DIAGNOSIS — I10 ESSENTIAL HYPERTENSION: ICD-10-CM

## 2022-11-16 RX ORDER — OLMESARTAN MEDOXOMIL 40 MG/1
40 TABLET ORAL DAILY
Qty: 90 TABLET | Refills: 0 | Status: SHIPPED | OUTPATIENT
Start: 2022-11-16 | End: 2023-02-07 | Stop reason: SDUPTHER

## 2022-11-16 NOTE — TELEPHONE ENCOUNTER
----- Message from Dottie Hatch sent at 11/16/2022  1:41 PM CST -----  - pt needs refill olmesartan   [phone]

## 2023-02-07 DIAGNOSIS — K21.9 GASTROESOPHAGEAL REFLUX DISEASE: ICD-10-CM

## 2023-02-07 DIAGNOSIS — I10 ESSENTIAL HYPERTENSION: ICD-10-CM

## 2023-02-07 RX ORDER — OMEPRAZOLE 20 MG/1
20 CAPSULE, DELAYED RELEASE ORAL DAILY
Qty: 90 CAPSULE | Refills: 1 | Status: SHIPPED | OUTPATIENT
Start: 2023-02-07 | End: 2023-06-02 | Stop reason: SDUPTHER

## 2023-02-07 RX ORDER — OLMESARTAN MEDOXOMIL 40 MG/1
40 TABLET ORAL DAILY
Qty: 90 TABLET | Refills: 0 | Status: SHIPPED | OUTPATIENT
Start: 2023-02-07 | End: 2023-06-02 | Stop reason: SDUPTHER

## 2023-02-07 NOTE — TELEPHONE ENCOUNTER
----- Message from Ladonna Frias sent at 2/7/2023 11:03 AM CST -----  Refill on: olmesartan    Omeprazole    [id number] - [facility] [address] AT [facility] [address]    [phone]

## 2023-02-14 ENCOUNTER — OFFICE VISIT (OUTPATIENT)
Dept: FAMILY MEDICINE | Facility: CLINIC | Age: 73
End: 2023-02-14
Payer: MEDICARE

## 2023-02-14 VITALS
HEIGHT: 71 IN | BODY MASS INDEX: 31.5 KG/M2 | WEIGHT: 225 LBS | SYSTOLIC BLOOD PRESSURE: 118 MMHG | HEART RATE: 50 BPM | DIASTOLIC BLOOD PRESSURE: 62 MMHG

## 2023-02-14 DIAGNOSIS — I10 ESSENTIAL HYPERTENSION: ICD-10-CM

## 2023-02-14 DIAGNOSIS — E03.9 HYPOTHYROIDISM, UNSPECIFIED TYPE: ICD-10-CM

## 2023-02-14 DIAGNOSIS — E11.9 TYPE 2 DIABETES MELLITUS WITHOUT COMPLICATION, WITHOUT LONG-TERM CURRENT USE OF INSULIN: Primary | ICD-10-CM

## 2023-02-14 LAB — HBA1C MFR BLD: 7.1 %

## 2023-02-14 PROCEDURE — 1101F PR PT FALLS ASSESS DOC 0-1 FALLS W/OUT INJ PAST YR: ICD-10-PCS | Mod: CPTII,S$GLB,, | Performed by: PHYSICIAN ASSISTANT

## 2023-02-14 PROCEDURE — 1101F PT FALLS ASSESS-DOCD LE1/YR: CPT | Mod: CPTII,S$GLB,, | Performed by: PHYSICIAN ASSISTANT

## 2023-02-14 PROCEDURE — 3051F PR MOST RECENT HEMOGLOBIN A1C LEVEL 7.0 - < 8.0%: ICD-10-PCS | Mod: CPTII,S$GLB,, | Performed by: PHYSICIAN ASSISTANT

## 2023-02-14 PROCEDURE — 3051F HG A1C>EQUAL 7.0%<8.0%: CPT | Mod: CPTII,S$GLB,, | Performed by: PHYSICIAN ASSISTANT

## 2023-02-14 PROCEDURE — 3074F PR MOST RECENT SYSTOLIC BLOOD PRESSURE < 130 MM HG: ICD-10-PCS | Mod: CPTII,S$GLB,, | Performed by: PHYSICIAN ASSISTANT

## 2023-02-14 PROCEDURE — 3008F BODY MASS INDEX DOCD: CPT | Mod: CPTII,S$GLB,, | Performed by: PHYSICIAN ASSISTANT

## 2023-02-14 PROCEDURE — 1159F PR MEDICATION LIST DOCUMENTED IN MEDICAL RECORD: ICD-10-PCS | Mod: CPTII,S$GLB,, | Performed by: PHYSICIAN ASSISTANT

## 2023-02-14 PROCEDURE — 1159F MED LIST DOCD IN RCRD: CPT | Mod: CPTII,S$GLB,, | Performed by: PHYSICIAN ASSISTANT

## 2023-02-14 PROCEDURE — 99214 OFFICE O/P EST MOD 30 MIN: CPT | Mod: S$GLB,,, | Performed by: PHYSICIAN ASSISTANT

## 2023-02-14 PROCEDURE — 99214 PR OFFICE/OUTPT VISIT, EST, LEVL IV, 30-39 MIN: ICD-10-PCS | Mod: S$GLB,,, | Performed by: PHYSICIAN ASSISTANT

## 2023-02-14 PROCEDURE — 3078F PR MOST RECENT DIASTOLIC BLOOD PRESSURE < 80 MM HG: ICD-10-PCS | Mod: CPTII,S$GLB,, | Performed by: PHYSICIAN ASSISTANT

## 2023-02-14 PROCEDURE — 83036 HEMOGLOBIN GLYCOSYLATED A1C: CPT | Mod: QW,,, | Performed by: PHYSICIAN ASSISTANT

## 2023-02-14 PROCEDURE — 83036 POCT HEMOGLOBIN A1C: ICD-10-PCS | Mod: QW,,, | Performed by: PHYSICIAN ASSISTANT

## 2023-02-14 PROCEDURE — 4010F ACE/ARB THERAPY RXD/TAKEN: CPT | Mod: CPTII,S$GLB,, | Performed by: PHYSICIAN ASSISTANT

## 2023-02-14 PROCEDURE — 3074F SYST BP LT 130 MM HG: CPT | Mod: CPTII,S$GLB,, | Performed by: PHYSICIAN ASSISTANT

## 2023-02-14 PROCEDURE — 3078F DIAST BP <80 MM HG: CPT | Mod: CPTII,S$GLB,, | Performed by: PHYSICIAN ASSISTANT

## 2023-02-14 PROCEDURE — 3008F PR BODY MASS INDEX (BMI) DOCUMENTED: ICD-10-PCS | Mod: CPTII,S$GLB,, | Performed by: PHYSICIAN ASSISTANT

## 2023-02-14 PROCEDURE — 3288F FALL RISK ASSESSMENT DOCD: CPT | Mod: CPTII,S$GLB,, | Performed by: PHYSICIAN ASSISTANT

## 2023-02-14 PROCEDURE — 3288F PR FALLS RISK ASSESSMENT DOCUMENTED: ICD-10-PCS | Mod: CPTII,S$GLB,, | Performed by: PHYSICIAN ASSISTANT

## 2023-02-14 PROCEDURE — 4010F PR ACE/ARB THEARPY RXD/TAKEN: ICD-10-PCS | Mod: CPTII,S$GLB,, | Performed by: PHYSICIAN ASSISTANT

## 2023-02-14 NOTE — PROGRESS NOTES
SUBJECTIVE:    Patient ID: Richy Hdz is a 72 y.o. male.    Chief Complaint: Follow-up (3 mo f/u//no med bottles//A1C ordered//foot ordered// last eye exam done 1990// referal made to opthamologist//tc)    This is a 72-year-old male who presents today for regular checkup.  Previous A1c as high as 8.2%.  He has made significant strides and changes to his diet.  He is also treated for hypertension, hypothyroid, hyperlipidemia. A1c today is 7.1%. BP is not checked regularly at home. He did have to reschedule a recent visit with endo. TSH has been suppressed. Lowered thyroid dose.      Office Visit on 02/14/2023   Component Date Value Ref Range Status    Hemoglobin A1C, POC 02/14/2023 7.1  % Final       Past Medical History:   Diagnosis Date    Back pain     Cancer     skin-ear    Gout attack     Hyperlipemia     Hypertension     Thyroid disease      Past Surgical History:   Procedure Laterality Date    JOINT REPLACEMENT      bilateral shoulder    kidney stones      SKIN CANCER EXCISION  2013    x 2 to ear.    TONSILLECTOMY      VASECTOMY       No family history on file.    Marital Status:   Alcohol History:  reports current alcohol use of about 7.0 standard drinks per week.  Tobacco History:  reports that he has quit smoking. His smoking use included cigars. He has never used smokeless tobacco.  Drug History:  has no history on file for drug use.    Review of patient's allergies indicates:  No Known Allergies    Current Outpatient Medications:     allopurinoL (ZYLOPRIM) 300 MG tablet, Take 1 tablet (300 mg total) by mouth once daily., Disp: 90 tablet, Rfl: 1    amLODIPine (NORVASC) 10 MG tablet, Take 1 tablet (10 mg total) by mouth once daily., Disp: 90 tablet, Rfl: 1    atorvastatin (LIPITOR) 40 MG tablet, Take 1 tablet (40 mg total) by mouth once daily., Disp: 90 tablet, Rfl: 1    BYSTOLIC 20 mg Tab, Take 20 mg by mouth once daily., Disp: 90 tablet, Rfl: 1    hydroCHLOROthiazide (HYDRODIURIL) 25 MG  "tablet, Take 1 tablet (25 mg total) by mouth once daily., Disp: 90 tablet, Rfl: 1    iron,carbonyl/ascorbic acid (VITRON-C ORAL), Take by mouth. Taking at night -, Disp: , Rfl:     levothyroxine (SYNTHROID) 175 MCG tablet, Take 1 tablet (175 mcg total) by mouth once daily., Disp: 90 tablet, Rfl: 1    multivit-min-FA-lycopen-lutein 0.4 mg-300 mcg- 250 mcg Tab, Take 1 tablet by mouth once daily., Disp: , Rfl:     nebivoloL (BYSTOLIC) 20 mg Tab, Take 20 mg by mouth once daily., Disp: 90 tablet, Rfl: 1    olmesartan (BENICAR) 40 MG tablet, Take 1 tablet (40 mg total) by mouth once daily., Disp: 90 tablet, Rfl: 0    omeprazole (PRILOSEC) 20 MG capsule, Take 1 capsule (20 mg total) by mouth once daily., Disp: 90 capsule, Rfl: 1    terazosin (HYTRIN) 5 MG capsule, Take 1 capsule (5 mg total) by mouth every evening., Disp: 90 capsule, Rfl: 1    Review of Systems   Constitutional:  Negative for activity change, fatigue, fever and unexpected weight change.   HENT:  Negative for congestion.    Respiratory:  Negative for apnea, cough, chest tightness and shortness of breath.    Cardiovascular:  Negative for chest pain and palpitations.   Gastrointestinal:  Negative for abdominal distention and abdominal pain.   Genitourinary:  Negative for difficulty urinating and dysuria.   Musculoskeletal:  Negative for arthralgias and back pain.   Neurological:  Negative for dizziness and weakness.        Objective:      Vitals:    02/14/23 0931 02/14/23 0944 02/14/23 1001   BP: (!) 156/64 (!) 157/65 118/62   Pulse: (!) 48 (!) 46 (!) 50   Weight: 102.1 kg (225 lb)     Height: 5' 11" (1.803 m)       Physical Exam  Constitutional:       General: He is not in acute distress.     Appearance: He is well-developed.   HENT:      Head: Normocephalic and atraumatic.   Eyes:      Pupils: Pupils are equal, round, and reactive to light.   Neck:      Thyroid: No thyromegaly.   Cardiovascular:      Rate and Rhythm: Normal rate and regular rhythm.      " Pulses:           Dorsalis pedis pulses are 2+ on the right side and 2+ on the left side.        Posterior tibial pulses are 2+ on the right side and 2+ on the left side.      Heart sounds: Normal heart sounds.   Pulmonary:      Effort: Pulmonary effort is normal.      Breath sounds: Normal breath sounds.   Abdominal:      General: Bowel sounds are normal. There is no distension.      Palpations: Abdomen is soft.      Tenderness: There is no abdominal tenderness.   Musculoskeletal:         General: Normal range of motion.      Cervical back: Normal range of motion and neck supple.      Right foot: Normal range of motion. No deformity.      Left foot: Normal range of motion. No deformity.   Feet:      Right foot:      Protective Sensation: 2 sites tested.  2 sites sensed.      Left foot:      Protective Sensation: 2 sites tested.  2 sites sensed.   Skin:     General: Skin is warm and dry.      Findings: No erythema or rash.   Neurological:      Mental Status: He is alert and oriented to person, place, and time.      Cranial Nerves: No cranial nerve deficit.         Assessment:       1. Type 2 diabetes mellitus without complication, without long-term current use of insulin    2. Essential hypertension    3. Hypothyroidism, unspecified type           Plan:       Type 2 diabetes mellitus without complication, without long-term current use of insulin  Comments:  A1c has significantly improved to 7.1% today. will continue as is with diet management.  Orders:  -     Hemoglobin A1C, POCT  -     Foot Exam Performed  -     Ambulatory referral/consult to Ophthalmology; Future; Expected date: 02/14/2023    Essential hypertension  Comments:  BP rechecked by me and at goal. continue as is.    Hypothyroidism, unspecified type  Comments:  Lowered dose previously to 175mcg. I do need to recheck TSH now.  Orders:  -     TSH w/reflex to FT4; Future; Expected date: 02/14/2023      Follow up in about 6 months (around 8/14/2023) for  Annual Physical.        2/14/2023 Adolfo Mae PA-C

## 2023-02-15 LAB
T4 FREE SERPL-MCNC: 1.5 NG/DL (ref 0.8–1.8)
TSH SERPL-ACNC: 0.13 MIU/L (ref 0.4–4.5)

## 2023-03-03 ENCOUNTER — PATIENT OUTREACH (OUTPATIENT)
Dept: ADMINISTRATIVE | Facility: HOSPITAL | Age: 73
End: 2023-03-03
Payer: MEDICARE

## 2023-03-03 ENCOUNTER — PATIENT MESSAGE (OUTPATIENT)
Dept: ADMINISTRATIVE | Facility: HOSPITAL | Age: 73
End: 2023-03-03
Payer: MEDICARE

## 2023-03-03 NOTE — PROGRESS NOTES
Eye Exam Gap Report Review. Care Everywhere updated and reviewed.Media  No new HM items found.    Patient portal message sent regarding overdue HM     Immunizations reviewed.

## 2023-03-13 ENCOUNTER — PATIENT OUTREACH (OUTPATIENT)
Dept: ADMINISTRATIVE | Facility: HOSPITAL | Age: 73
End: 2023-03-13
Payer: MEDICARE

## 2023-03-13 NOTE — PROGRESS NOTES
"Attempted to outreach patient regarding overdue/ due HM via "[employer]". No response after a week. Now sending outreach via mail out letter.    Immunizations reviewed.    "

## 2023-03-13 NOTE — LETTER
March 13, 2023    Richy Hdz  [address]  [facility] [address]             [facility]  [address]  [facility] [address]  Phone: [phone] Counts include 234 beds at the [facility] is committed to your overall health.  To help you get the most out of each of your visits, we will review your information to make sure you are up to date on all of your recommended tests and/or procedures.       Your PCP  Kenneth Wyatt MD   found that you may be due for:     Health Maintenance Due   Topic Date Due    Hepatitis C Screening  Never done    Pneumococcal Vaccines (Age 65+) (1 - PCV) Never done    Eye Exam  Never done    TETANUS VACCINE  Never done    Low Dose Statin  Never done    Shingles Vaccine (1 of 2) Never done    Abdominal Aortic Aneurysm Screening  Never done    COVID-19 Vaccine (4 - Booster for Pfizer series) 02/07/2022    Influenza Vaccine (1) Never done    Lipid Panel  02/09/2023    Diabetes Urine Screening  02/09/2023         If you have had any of the above done at another facility, please let us know where you went so that we can request your record. So that your chart with  [facility] will be complete.  If you would like to schedule any of these, please contact us at [phone].      Thank you for choosing [facility] .    Sheeba HERNANDEZ  Clinical Care Coordinator    Counts include 234 beds at the [facility] / [facility]  [phone] (Phone)  [phone] (Fax)

## 2023-04-17 ENCOUNTER — PATIENT MESSAGE (OUTPATIENT)
Dept: ADMINISTRATIVE | Facility: HOSPITAL | Age: 73
End: 2023-04-17
Payer: MEDICARE

## 2023-06-02 ENCOUNTER — PATIENT MESSAGE (OUTPATIENT)
Dept: FAMILY MEDICINE | Facility: CLINIC | Age: 73
End: 2023-06-02

## 2023-06-02 DIAGNOSIS — E03.9 HYPOTHYROIDISM, UNSPECIFIED TYPE: ICD-10-CM

## 2023-06-02 DIAGNOSIS — I10 ESSENTIAL HYPERTENSION: ICD-10-CM

## 2023-06-02 DIAGNOSIS — E78.5 HYPERLIPIDEMIA, UNSPECIFIED HYPERLIPIDEMIA TYPE: ICD-10-CM

## 2023-06-02 DIAGNOSIS — K21.9 GASTROESOPHAGEAL REFLUX DISEASE: ICD-10-CM

## 2023-06-02 RX ORDER — TERAZOSIN 5 MG/1
5 CAPSULE ORAL NIGHTLY
Qty: 10 CAPSULE | Refills: 0 | Status: SHIPPED | OUTPATIENT
Start: 2023-06-02 | End: 2023-07-11 | Stop reason: SDUPTHER

## 2023-06-02 RX ORDER — NEBIVOLOL HYDROCHLORIDE 20 MG/1
20 TABLET ORAL DAILY
Qty: 10 TABLET | Refills: 0 | Status: SHIPPED | OUTPATIENT
Start: 2023-06-02 | End: 2023-07-11 | Stop reason: SDUPTHER

## 2023-06-02 RX ORDER — ALLOPURINOL 300 MG/1
300 TABLET ORAL DAILY
Qty: 10 TABLET | Refills: 0 | Status: SHIPPED | OUTPATIENT
Start: 2023-06-02 | End: 2023-07-11 | Stop reason: SDUPTHER

## 2023-06-02 RX ORDER — ATORVASTATIN CALCIUM 40 MG/1
40 TABLET, FILM COATED ORAL DAILY
Qty: 10 TABLET | Refills: 0 | Status: SHIPPED | OUTPATIENT
Start: 2023-06-02 | End: 2023-07-11 | Stop reason: SDUPTHER

## 2023-06-02 RX ORDER — OLMESARTAN MEDOXOMIL 40 MG/1
40 TABLET ORAL DAILY
Qty: 10 TABLET | Refills: 0 | Status: SHIPPED | OUTPATIENT
Start: 2023-06-02 | End: 2023-07-11 | Stop reason: SDUPTHER

## 2023-06-02 RX ORDER — AMLODIPINE BESYLATE 10 MG/1
10 TABLET ORAL DAILY
Qty: 10 TABLET | Refills: 0 | Status: SHIPPED | OUTPATIENT
Start: 2023-06-02 | End: 2023-07-11 | Stop reason: SDUPTHER

## 2023-06-02 RX ORDER — LEVOTHYROXINE SODIUM 175 UG/1
175 TABLET ORAL DAILY
Qty: 10 TABLET | Refills: 0 | Status: SHIPPED | OUTPATIENT
Start: 2023-06-02 | End: 2023-07-11 | Stop reason: SDUPTHER

## 2023-06-02 RX ORDER — OMEPRAZOLE 20 MG/1
20 CAPSULE, DELAYED RELEASE ORAL DAILY
Qty: 10 CAPSULE | Refills: 0 | Status: SHIPPED | OUTPATIENT
Start: 2023-06-02 | End: 2023-07-11 | Stop reason: SDUPTHER

## 2023-07-11 DIAGNOSIS — I10 ESSENTIAL HYPERTENSION: ICD-10-CM

## 2023-07-11 DIAGNOSIS — K21.9 GASTROESOPHAGEAL REFLUX DISEASE: ICD-10-CM

## 2023-07-11 DIAGNOSIS — E78.5 HYPERLIPIDEMIA, UNSPECIFIED HYPERLIPIDEMIA TYPE: ICD-10-CM

## 2023-07-11 DIAGNOSIS — E03.9 HYPOTHYROIDISM, UNSPECIFIED TYPE: ICD-10-CM

## 2023-07-11 RX ORDER — NEBIVOLOL HYDROCHLORIDE 20 MG/1
20 TABLET ORAL DAILY
Qty: 90 TABLET | Refills: 1 | Status: SHIPPED | OUTPATIENT
Start: 2023-07-11 | End: 2023-07-20 | Stop reason: SDUPTHER

## 2023-07-11 RX ORDER — OLMESARTAN MEDOXOMIL 40 MG/1
40 TABLET ORAL DAILY
Qty: 90 TABLET | Refills: 1 | Status: SHIPPED | OUTPATIENT
Start: 2023-07-11 | End: 2023-07-20 | Stop reason: SDUPTHER

## 2023-07-11 RX ORDER — ATORVASTATIN CALCIUM 40 MG/1
40 TABLET, FILM COATED ORAL DAILY
Qty: 90 TABLET | Refills: 1 | Status: SHIPPED | OUTPATIENT
Start: 2023-07-11 | End: 2023-07-20 | Stop reason: SDUPTHER

## 2023-07-11 RX ORDER — TERAZOSIN 5 MG/1
5 CAPSULE ORAL NIGHTLY
Qty: 90 CAPSULE | Refills: 1 | Status: SHIPPED | OUTPATIENT
Start: 2023-07-11 | End: 2023-07-20 | Stop reason: SDUPTHER

## 2023-07-11 RX ORDER — HYDROCHLOROTHIAZIDE 25 MG/1
25 TABLET ORAL DAILY
Qty: 90 TABLET | Refills: 1 | Status: SHIPPED | OUTPATIENT
Start: 2023-07-11 | End: 2023-07-20 | Stop reason: SDUPTHER

## 2023-07-11 RX ORDER — AMLODIPINE BESYLATE 10 MG/1
10 TABLET ORAL DAILY
Qty: 90 TABLET | Refills: 1 | Status: SHIPPED | OUTPATIENT
Start: 2023-07-11 | End: 2023-07-20 | Stop reason: SDUPTHER

## 2023-07-11 RX ORDER — ALLOPURINOL 300 MG/1
300 TABLET ORAL DAILY
Qty: 90 TABLET | Refills: 1 | Status: SHIPPED | OUTPATIENT
Start: 2023-07-11 | End: 2023-07-20 | Stop reason: SDUPTHER

## 2023-07-11 RX ORDER — OMEPRAZOLE 20 MG/1
20 CAPSULE, DELAYED RELEASE ORAL DAILY
Qty: 90 CAPSULE | Refills: 1 | Status: SHIPPED | OUTPATIENT
Start: 2023-07-11 | End: 2023-07-20 | Stop reason: SDUPTHER

## 2023-07-11 RX ORDER — LEVOTHYROXINE SODIUM 175 UG/1
175 TABLET ORAL DAILY
Qty: 90 TABLET | Refills: 1 | Status: SHIPPED | OUTPATIENT
Start: 2023-07-11 | End: 2023-07-20 | Stop reason: SDUPTHER

## 2023-07-11 NOTE — TELEPHONE ENCOUNTER
----- Message from Robles Quick MA sent at 7/11/2023  9:32 AM CDT -----  Regarding: refill issue  Pt calling stating that he left a message in June that he needed all of his meds filled on 7/3 and that hasn't happened. Pt states he uses [facility] on [facility]. [phone]

## 2023-07-18 ENCOUNTER — TELEPHONE (OUTPATIENT)
Dept: FAMILY MEDICINE | Facility: CLINIC | Age: 73
End: 2023-07-18

## 2023-07-18 NOTE — TELEPHONE ENCOUNTER
----- Message from Nevaeh Myers sent at 7/18/2023 11:25 AM CDT -----   FYI : Armando called from the prior authorization dept (was unable to make out from which company)and she needed the office fax number . She did not state what she was faxing over. No patient call back needed for this message.

## 2023-07-20 DIAGNOSIS — E03.9 HYPOTHYROIDISM, UNSPECIFIED TYPE: ICD-10-CM

## 2023-07-20 DIAGNOSIS — K21.9 GASTROESOPHAGEAL REFLUX DISEASE: ICD-10-CM

## 2023-07-20 DIAGNOSIS — I10 ESSENTIAL HYPERTENSION: ICD-10-CM

## 2023-07-20 DIAGNOSIS — E78.5 HYPERLIPIDEMIA, UNSPECIFIED HYPERLIPIDEMIA TYPE: ICD-10-CM

## 2023-07-20 RX ORDER — OLMESARTAN MEDOXOMIL 40 MG/1
40 TABLET ORAL DAILY
Qty: 90 TABLET | Refills: 1 | Status: SHIPPED | OUTPATIENT
Start: 2023-07-20 | End: 2023-08-16 | Stop reason: SDUPTHER

## 2023-07-20 RX ORDER — LEVOTHYROXINE SODIUM 175 UG/1
175 TABLET ORAL DAILY
Qty: 90 TABLET | Refills: 1 | Status: SHIPPED | OUTPATIENT
Start: 2023-07-20 | End: 2023-08-16 | Stop reason: SDUPTHER

## 2023-07-20 RX ORDER — NEBIVOLOL HYDROCHLORIDE 20 MG/1
20 TABLET ORAL DAILY
Qty: 90 TABLET | Refills: 1 | Status: SHIPPED | OUTPATIENT
Start: 2023-07-20 | End: 2023-07-25 | Stop reason: SDUPTHER

## 2023-07-20 RX ORDER — ALLOPURINOL 300 MG/1
300 TABLET ORAL DAILY
Qty: 90 TABLET | Refills: 1 | Status: SHIPPED | OUTPATIENT
Start: 2023-07-20 | End: 2023-08-16 | Stop reason: SDUPTHER

## 2023-07-20 RX ORDER — ATORVASTATIN CALCIUM 40 MG/1
40 TABLET, FILM COATED ORAL DAILY
Qty: 90 TABLET | Refills: 1 | Status: SHIPPED | OUTPATIENT
Start: 2023-07-20 | End: 2023-08-16 | Stop reason: SDUPTHER

## 2023-07-20 RX ORDER — AMLODIPINE BESYLATE 10 MG/1
10 TABLET ORAL DAILY
Qty: 90 TABLET | Refills: 1 | Status: SHIPPED | OUTPATIENT
Start: 2023-07-20 | End: 2023-08-16 | Stop reason: SDUPTHER

## 2023-07-20 RX ORDER — TERAZOSIN 5 MG/1
5 CAPSULE ORAL NIGHTLY
Qty: 90 CAPSULE | Refills: 1 | Status: SHIPPED | OUTPATIENT
Start: 2023-07-20 | End: 2023-08-16 | Stop reason: SDUPTHER

## 2023-07-20 RX ORDER — OMEPRAZOLE 20 MG/1
20 CAPSULE, DELAYED RELEASE ORAL DAILY
Qty: 90 CAPSULE | Refills: 1 | Status: SHIPPED | OUTPATIENT
Start: 2023-07-20 | End: 2023-08-16 | Stop reason: SDUPTHER

## 2023-07-20 RX ORDER — HYDROCHLOROTHIAZIDE 25 MG/1
25 TABLET ORAL DAILY
Qty: 90 TABLET | Refills: 1 | Status: SHIPPED | OUTPATIENT
Start: 2023-07-20 | End: 2023-08-16 | Stop reason: SDUPTHER

## 2023-07-20 NOTE — TELEPHONE ENCOUNTER
----- Message from Ly Lang sent at 7/20/2023  3:12 PM CDT -----  PA for Bystolic has been denied. Pt has to have a tried and failed to one of the following: acebutolol, atenolol, betaxolol, bisoprolol, carvedilol, metoprolol. Generic bystolic does not require a PA. Please advise. Thank you

## 2023-07-20 NOTE — TELEPHONE ENCOUNTER
----- Message from Nevaeh Myers sent at 7/20/2023  3:06 PM CDT -----  Patient called and stated that he is having issues with getting his medication refilled and he would like to know the status ? Please give him a call at [phone]

## 2023-07-20 NOTE — TELEPHONE ENCOUNTER
Spoke with pt in regards to message sent. Pt is wanting to know the status of his Bystolic. Please see other telephone encounter for today.

## 2023-07-21 NOTE — TELEPHONE ENCOUNTER
Pt states that either way that the generic or brand name is going to cost him about the same, so the pt was like why not get the brand name then.

## 2023-07-21 NOTE — TELEPHONE ENCOUNTER
Through the [facility] I showed that Bystolic generic nebivolol is available at [facility] for 15 dollars a month.  Let us make sure he is aware that he can get this filled through that at

## 2023-07-26 RX ORDER — NEBIVOLOL 20 MG/1
20 TABLET ORAL DAILY
Qty: 90 TABLET | Refills: 1 | Status: SHIPPED | OUTPATIENT
Start: 2023-07-26 | End: 2023-07-31 | Stop reason: SDUPTHER

## 2023-07-31 DIAGNOSIS — I10 ESSENTIAL HYPERTENSION: ICD-10-CM

## 2023-07-31 RX ORDER — NEBIVOLOL 20 MG/1
20 TABLET ORAL DAILY
Qty: 90 TABLET | Refills: 1 | Status: SHIPPED | OUTPATIENT
Start: 2023-07-31 | End: 2023-08-16 | Stop reason: SDUPTHER

## 2023-07-31 NOTE — TELEPHONE ENCOUNTER
Ly Mae Staff  Caller: Unspecified (Today,  8:43 AM)  Pt is changing pharmacies from [facility] to [facility]. Only the bystolic refill needs to go to [facility] [address] due to cost. Everything else needs to go to [facility]. Pt #[phone]

## 2023-07-31 NOTE — TELEPHONE ENCOUNTER
Spoke to pt and let him know the Bystolic was sent to he pharmacy on 7/26.Pt stated he called the other day and they told him it was cancelled, Pt is needing it resent to the [facility] on [address]

## 2023-07-31 NOTE — TELEPHONE ENCOUNTER
----- Message from Dottie Hatch sent at 7/31/2023  9:08 AM CDT -----  Pt pharmacy told him the Bystolic was cancelled.   Anupam bangura   [phone]

## 2023-08-16 ENCOUNTER — OFFICE VISIT (OUTPATIENT)
Dept: FAMILY MEDICINE | Facility: CLINIC | Age: 73
End: 2023-08-16
Payer: MEDICARE

## 2023-08-16 VITALS
HEART RATE: 60 BPM | SYSTOLIC BLOOD PRESSURE: 138 MMHG | DIASTOLIC BLOOD PRESSURE: 60 MMHG | HEIGHT: 71 IN | BODY MASS INDEX: 30.94 KG/M2 | WEIGHT: 221 LBS

## 2023-08-16 DIAGNOSIS — I10 ESSENTIAL HYPERTENSION: ICD-10-CM

## 2023-08-16 DIAGNOSIS — E11.9 TYPE 2 DIABETES MELLITUS WITHOUT COMPLICATION, WITHOUT LONG-TERM CURRENT USE OF INSULIN: Primary | ICD-10-CM

## 2023-08-16 DIAGNOSIS — E78.5 HYPERLIPIDEMIA, UNSPECIFIED HYPERLIPIDEMIA TYPE: ICD-10-CM

## 2023-08-16 DIAGNOSIS — K21.9 GASTROESOPHAGEAL REFLUX DISEASE WITHOUT ESOPHAGITIS: ICD-10-CM

## 2023-08-16 DIAGNOSIS — M10.09 IDIOPATHIC GOUT OF MULTIPLE SITES, UNSPECIFIED CHRONICITY: ICD-10-CM

## 2023-08-16 DIAGNOSIS — M47.817 SPONDYLOSIS WITHOUT MYELOPATHY OR RADICULOPATHY, LUMBOSACRAL REGION: ICD-10-CM

## 2023-08-16 DIAGNOSIS — Z12.11 SPECIAL SCREENING FOR MALIGNANT NEOPLASMS, COLON: ICD-10-CM

## 2023-08-16 DIAGNOSIS — M47.817 LUMBOSACRAL SPONDYLOSIS WITHOUT MYELOPATHY: ICD-10-CM

## 2023-08-16 DIAGNOSIS — Z12.5 SPECIAL SCREENING FOR MALIGNANT NEOPLASM OF PROSTATE: ICD-10-CM

## 2023-08-16 DIAGNOSIS — E03.9 HYPOTHYROIDISM, UNSPECIFIED TYPE: ICD-10-CM

## 2023-08-16 DIAGNOSIS — F41.9 ANXIETY DISORDER, UNSPECIFIED TYPE: ICD-10-CM

## 2023-08-16 LAB — HBA1C MFR BLD: 7.2 %

## 2023-08-16 PROCEDURE — 1101F PR PT FALLS ASSESS DOC 0-1 FALLS W/OUT INJ PAST YR: ICD-10-PCS | Mod: CPTII,S$GLB,, | Performed by: FAMILY MEDICINE

## 2023-08-16 PROCEDURE — 3075F SYST BP GE 130 - 139MM HG: CPT | Mod: CPTII,S$GLB,, | Performed by: FAMILY MEDICINE

## 2023-08-16 PROCEDURE — 3008F PR BODY MASS INDEX (BMI) DOCUMENTED: ICD-10-PCS | Mod: CPTII,S$GLB,, | Performed by: FAMILY MEDICINE

## 2023-08-16 PROCEDURE — 3078F DIAST BP <80 MM HG: CPT | Mod: CPTII,S$GLB,, | Performed by: FAMILY MEDICINE

## 2023-08-16 PROCEDURE — 83036 POCT HEMOGLOBIN A1C: ICD-10-PCS | Mod: QW,,, | Performed by: FAMILY MEDICINE

## 2023-08-16 PROCEDURE — 3288F FALL RISK ASSESSMENT DOCD: CPT | Mod: CPTII,S$GLB,, | Performed by: FAMILY MEDICINE

## 2023-08-16 PROCEDURE — 4010F PR ACE/ARB THEARPY RXD/TAKEN: ICD-10-PCS | Mod: CPTII,S$GLB,, | Performed by: FAMILY MEDICINE

## 2023-08-16 PROCEDURE — 99214 OFFICE O/P EST MOD 30 MIN: CPT | Mod: S$GLB,,, | Performed by: FAMILY MEDICINE

## 2023-08-16 PROCEDURE — 3051F PR MOST RECENT HEMOGLOBIN A1C LEVEL 7.0 - < 8.0%: ICD-10-PCS | Mod: CPTII,S$GLB,, | Performed by: FAMILY MEDICINE

## 2023-08-16 PROCEDURE — 3075F PR MOST RECENT SYSTOLIC BLOOD PRESS GE 130-139MM HG: ICD-10-PCS | Mod: CPTII,S$GLB,, | Performed by: FAMILY MEDICINE

## 2023-08-16 PROCEDURE — 3008F BODY MASS INDEX DOCD: CPT | Mod: CPTII,S$GLB,, | Performed by: FAMILY MEDICINE

## 2023-08-16 PROCEDURE — 1101F PT FALLS ASSESS-DOCD LE1/YR: CPT | Mod: CPTII,S$GLB,, | Performed by: FAMILY MEDICINE

## 2023-08-16 PROCEDURE — 83036 HEMOGLOBIN GLYCOSYLATED A1C: CPT | Mod: QW,,, | Performed by: FAMILY MEDICINE

## 2023-08-16 PROCEDURE — 1159F PR MEDICATION LIST DOCUMENTED IN MEDICAL RECORD: ICD-10-PCS | Mod: CPTII,S$GLB,, | Performed by: FAMILY MEDICINE

## 2023-08-16 PROCEDURE — 1159F MED LIST DOCD IN RCRD: CPT | Mod: CPTII,S$GLB,, | Performed by: FAMILY MEDICINE

## 2023-08-16 PROCEDURE — 3288F PR FALLS RISK ASSESSMENT DOCUMENTED: ICD-10-PCS | Mod: CPTII,S$GLB,, | Performed by: FAMILY MEDICINE

## 2023-08-16 PROCEDURE — 3078F PR MOST RECENT DIASTOLIC BLOOD PRESSURE < 80 MM HG: ICD-10-PCS | Mod: CPTII,S$GLB,, | Performed by: FAMILY MEDICINE

## 2023-08-16 PROCEDURE — 99214 PR OFFICE/OUTPT VISIT, EST, LEVL IV, 30-39 MIN: ICD-10-PCS | Mod: S$GLB,,, | Performed by: FAMILY MEDICINE

## 2023-08-16 PROCEDURE — 4010F ACE/ARB THERAPY RXD/TAKEN: CPT | Mod: CPTII,S$GLB,, | Performed by: FAMILY MEDICINE

## 2023-08-16 PROCEDURE — 3051F HG A1C>EQUAL 7.0%<8.0%: CPT | Mod: CPTII,S$GLB,, | Performed by: FAMILY MEDICINE

## 2023-08-16 RX ORDER — ALLOPURINOL 300 MG/1
300 TABLET ORAL DAILY
Qty: 90 TABLET | Refills: 3 | Status: SHIPPED | OUTPATIENT
Start: 2023-08-16

## 2023-08-16 RX ORDER — AMLODIPINE BESYLATE 10 MG/1
10 TABLET ORAL DAILY
Qty: 90 TABLET | Refills: 3 | Status: SHIPPED | OUTPATIENT
Start: 2023-08-16 | End: 2024-02-12

## 2023-08-16 RX ORDER — NATEGLINIDE 60 MG/1
60 TABLET ORAL DAILY
Qty: 90 TABLET | Refills: 3 | Status: SHIPPED | OUTPATIENT
Start: 2023-08-16 | End: 2024-08-15

## 2023-08-16 RX ORDER — OLMESARTAN MEDOXOMIL 40 MG/1
40 TABLET ORAL DAILY
Qty: 90 TABLET | Refills: 3 | Status: SHIPPED | OUTPATIENT
Start: 2023-08-16

## 2023-08-16 RX ORDER — ATORVASTATIN CALCIUM 40 MG/1
40 TABLET, FILM COATED ORAL DAILY
Qty: 90 TABLET | Refills: 3 | Status: SHIPPED | OUTPATIENT
Start: 2023-08-16 | End: 2024-02-12

## 2023-08-16 RX ORDER — HYDROCHLOROTHIAZIDE 25 MG/1
25 TABLET ORAL DAILY
Qty: 90 TABLET | Refills: 3 | Status: SHIPPED | OUTPATIENT
Start: 2023-08-16 | End: 2024-02-12

## 2023-08-16 RX ORDER — TERAZOSIN 5 MG/1
5 CAPSULE ORAL NIGHTLY
Qty: 90 CAPSULE | Refills: 3 | Status: SHIPPED | OUTPATIENT
Start: 2023-08-16

## 2023-08-16 RX ORDER — OMEPRAZOLE 20 MG/1
20 CAPSULE, DELAYED RELEASE ORAL DAILY
Qty: 90 CAPSULE | Refills: 3 | Status: SHIPPED | OUTPATIENT
Start: 2023-08-16

## 2023-08-16 RX ORDER — LEVOTHYROXINE SODIUM 175 UG/1
175 TABLET ORAL DAILY
Qty: 90 TABLET | Refills: 3 | Status: SHIPPED | OUTPATIENT
Start: 2023-08-16 | End: 2024-02-12

## 2023-08-16 RX ORDER — NEBIVOLOL 20 MG/1
20 TABLET ORAL DAILY
Qty: 90 TABLET | Refills: 3 | Status: SHIPPED | OUTPATIENT
Start: 2023-08-16 | End: 2023-12-12 | Stop reason: SDUPTHER

## 2023-08-18 NOTE — PROGRESS NOTES
"  SUBJECTIVE:    Patient ID: Richy Hdz is a 72 y.o. male.    Chief Complaint: Annual Exam (No bottles, declined pna vaccine, no complaints, need refills, A1c, Eye exam ordered,abc )    72-year-old male staying active by mowing the lawn and doing weed eating twice a week on the [facility].  He does gardening part-time as well.  He denies shortness or breath or chest pain during activity.  He does get dizzy at times in the heat.    2016-partial left colectomy due to cancer.    Osteoarthritis hands gets stiffness and uses Aleve 1 tablet nightly.    2016-colonoscopy, he is due for another colonoscopies soon.    Diabetes type 2-A1c rising up to 7.2.  He does not want to take metformin.        Office Visit on 08/16/2023   Component Date Value Ref Range Status    Hemoglobin A1C, POC 08/16/2023 7.2  % Final       Past Medical History:   Diagnosis Date    Back pain     Cancer     skin-ear    Gout attack     Hyperlipemia     Hypertension     Thyroid disease      Social History     Socioeconomic History    Marital status:    Tobacco Use    Smoking status: Former     Current packs/day: 0.00     Types: Cigars    Smokeless tobacco: Never    Tobacco comments:     "now and again"   Substance and Sexual Activity    Alcohol use: Yes     Alcohol/week: 7.0 standard drinks of alcohol     Types: 7 Shots of liquor per week     Comment: occasional  rum on some nights.     Past Surgical History:   Procedure Laterality Date    JOINT REPLACEMENT      bilateral shoulder    kidney stones      SKIN CANCER EXCISION  2013    x 2 to ear.    TONSILLECTOMY      VASECTOMY       No family history on file.    The 10-year CVD risk score (D'Agostino, et al., 2008) is: 67.2%    Values used to calculate the score:      Age: 72 years      Sex: Male      Diabetic: Yes      Tobacco smoker: No      Systolic Blood Pressure: 138 mmHg      Is BP treated: Yes      HDL Cholesterol: 42 mg/dL      Total Cholesterol: 232 mg/dL    Tests to Keep You " Healthy    Eye Exam: ORDERED BUT NOT SCHEDULED  Colon Cancer Screening: Met on 4/27/2016  Last Blood Pressure <= 139/89 (8/16/2023): Yes  Last HbA1c < 8 (08/16/2023): Yes      Review of patient's allergies indicates:  No Known Allergies    Current Outpatient Medications:     iron,carbonyl/ascorbic acid (VITRON-C ORAL), Take by mouth. Taking at night -, Disp: , Rfl:     multivit-min-FA-lycopen-lutein 0.4 mg-300 mcg- 250 mcg Tab, Take 1 tablet by mouth once daily., Disp: , Rfl:     allopurinoL (ZYLOPRIM) 300 MG tablet, Take 1 tablet (300 mg total) by mouth once daily., Disp: 90 tablet, Rfl: 3    amLODIPine (NORVASC) 10 MG tablet, Take 1 tablet (10 mg total) by mouth once daily., Disp: 90 tablet, Rfl: 3    atorvastatin (LIPITOR) 40 MG tablet, Take 1 tablet (40 mg total) by mouth once daily., Disp: 90 tablet, Rfl: 3    hydroCHLOROthiazide (HYDRODIURIL) 25 MG tablet, Take 1 tablet (25 mg total) by mouth once daily., Disp: 90 tablet, Rfl: 3    levothyroxine (SYNTHROID) 175 MCG tablet, Take 1 tablet (175 mcg total) by mouth once daily., Disp: 90 tablet, Rfl: 3    nateglinide (STARLIX) 60 MG tablet, Take 1 tablet (60 mg total) by mouth once daily., Disp: 90 tablet, Rfl: 3    nebivoloL (BYSTOLIC) 20 mg Tab, Take 20 mg by mouth once daily., Disp: 90 tablet, Rfl: 3    olmesartan (BENICAR) 40 MG tablet, Take 1 tablet (40 mg total) by mouth once daily., Disp: 90 tablet, Rfl: 3    omeprazole (PRILOSEC) 20 MG capsule, Take 1 capsule (20 mg total) by mouth once daily., Disp: 90 capsule, Rfl: 3    terazosin (HYTRIN) 5 MG capsule, Take 1 capsule (5 mg total) by mouth every evening., Disp: 90 capsule, Rfl: 3    Review of Systems   Constitutional:  Negative for appetite change, chills, fatigue, fever and unexpected weight change.   HENT:  Negative for ear pain and trouble swallowing.    Eyes:  Negative for pain, discharge and visual disturbance.   Respiratory:  Negative for apnea, cough, shortness of breath and wheezing.   "  Cardiovascular:  Negative for chest pain and leg swelling.   Gastrointestinal:  Negative for abdominal pain, blood in stool, constipation, diarrhea, nausea, vomiting and reflux.   Endocrine: Negative for cold intolerance, heat intolerance and polydipsia.   Genitourinary:  Negative for bladder incontinence, dysuria, erectile dysfunction, frequency, hematuria, testicular pain and urgency.   Musculoskeletal:  Positive for arthralgias (hands and fingers gets stiff.). Negative for gait problem, joint swelling and myalgias.   Neurological:  Negative for dizziness, seizures and numbness.   Psychiatric/Behavioral:  Negative for agitation, behavioral problems and hallucinations. The patient is not nervous/anxious.            Objective:      Vitals:    08/16/23 0922   BP: 138/60   Pulse: 60   Weight: 100.2 kg (221 lb)   Height: 5' 11" (1.803 m)     Physical Exam  Vitals and nursing note reviewed.   Constitutional:       General: He is not in acute distress.     Appearance: He is well-developed. He is obese. He is not toxic-appearing.   HENT:      Head: Normocephalic and atraumatic.      Right Ear: Tympanic membrane and external ear normal.      Left Ear: Tympanic membrane and external ear normal.      Nose: Nose normal.      Mouth/Throat:      Pharynx: Oropharynx is clear.   Eyes:      Pupils: Pupils are equal, round, and reactive to light.   Neck:      Thyroid: No thyromegaly.      Vascular: No carotid bruit.   Cardiovascular:      Rate and Rhythm: Normal rate and regular rhythm.      Heart sounds: Normal heart sounds. No murmur heard.  Pulmonary:      Effort: Pulmonary effort is normal.      Breath sounds: Normal breath sounds. No wheezing or rales.   Abdominal:      General: Bowel sounds are normal. There is no distension.      Palpations: Abdomen is soft.      Tenderness: There is no abdominal tenderness.   Musculoskeletal:         General: No tenderness or deformity. Normal range of motion.      Cervical back: Normal " range of motion and neck supple.      Lumbar back: Normal. No spasms.      Comments: Bends 90 degrees at  waist shoulders and knees good range of motion without crepitance.  No pitting edema to lower extremities   Lymphadenopathy:      Cervical: No cervical adenopathy.   Skin:     General: Skin is warm and dry.      Findings: No rash.   Neurological:      Mental Status: He is alert and oriented to person, place, and time.      Cranial Nerves: No cranial nerve deficit.      Coordination: Coordination normal.      Gait: Gait normal.   Psychiatric:         Mood and Affect: Mood normal.         Behavior: Behavior normal.         Thought Content: Thought content normal.         Judgment: Judgment normal.           Assessment:       1. Type 2 diabetes mellitus without complication, without long-term current use of insulin    2. Essential hypertension    3. Hyperlipidemia, unspecified hyperlipidemia type    4. Essential hypertension    5. Hypothyroidism, unspecified type    6. Essential hypertension    7. Gastroesophageal reflux disease    8. Anxiety disorder, unspecified type    9. Spondylosis without myelopathy or radiculopathy, lumbosacral region    10. Idiopathic gout of multiple sites, unspecified chronicity    11. Special screening for malignant neoplasm of prostate    12. Special screening for malignant neoplasms, colon         Plan:       Type 2 diabetes mellitus without complication, without long-term current use of insulin  -     Hemoglobin A1C, POCT  -     Ambulatory referral/consult to Ophthalmology; Future; Expected date: 08/23/2023  -     nateglinide (STARLIX) 60 MG tablet; Take 1 tablet (60 mg total) by mouth once daily.  Dispense: 90 tablet; Refill: 3  -     CBC Auto Differential; Future; Expected date: 08/16/2023  -     Comprehensive Metabolic Panel; Future; Expected date: 08/16/2023  -     Hemoglobin A1C; Future; Expected date: 08/16/2023  -     Lipid Panel; Future; Expected date: 08/16/2023  -      Urinalysis, Reflex to Urine Culture Urine, Clean Catch; Future; Expected date: 08/16/2023  -     TSH w/reflex to FT4; Future; Expected date: 08/16/2023  A1c rising up to 7.2.  Will add nateglinide 60 mg daily and recheck labs in 3 months  Essential hypertension    Orders:  -     amLODIPine (NORVASC) 10 MG tablet; Take 1 tablet (10 mg total) by mouth once daily.  Dispense: 90 tablet; Refill: 3  -     hydroCHLOROthiazide (HYDRODIURIL) 25 MG tablet; Take 1 tablet (25 mg total) by mouth once daily.  Dispense: 90 tablet; Refill: 3  -     nebivoloL (BYSTOLIC) 20 mg Tab; Take 20 mg by mouth once daily.  Dispense: 90 tablet; Refill: 3  -     olmesartan (BENICAR) 40 MG tablet; Take 1 tablet (40 mg total) by mouth once daily.  Dispense: 90 tablet; Refill: 3  -     terazosin (HYTRIN) 5 MG capsule; Take 1 capsule (5 mg total) by mouth every evening.  Dispense: 90 capsule; Refill: 3  Blood pressure well controlled  Hyperlipidemia, unspecified hyperlipidemia type  Comments:  stable, continue as is. Really watching the diet and adding exercise.  Orders:  -     atorvastatin (LIPITOR) 40 MG tablet; Take 1 tablet (40 mg total) by mouth once daily.  Dispense: 90 tablet; Refill: 3    Essential hypertension  -     amLODIPine (NORVASC) 10 MG tablet; Take 1 tablet (10 mg total) by mouth once daily.  Dispense: 90 tablet; Refill: 3  -     hydroCHLOROthiazide (HYDRODIURIL) 25 MG tablet; Take 1 tablet (25 mg total) by mouth once daily.  Dispense: 90 tablet; Refill: 3  -     nebivoloL (BYSTOLIC) 20 mg Tab; Take 20 mg by mouth once daily.  Dispense: 90 tablet; Refill: 3  -     olmesartan (BENICAR) 40 MG tablet; Take 1 tablet (40 mg total) by mouth once daily.  Dispense: 90 tablet; Refill: 3  -     terazosin (HYTRIN) 5 MG capsule; Take 1 capsule (5 mg total) by mouth every evening.  Dispense: 90 capsule; Refill: 3    Hypothyroidism, unspecified type  Comments:  TSH still suppressed. lower to 175mcg and recheck tsh/ft4 in 4 weeks.  Orders:  -      levothyroxine (SYNTHROID) 175 MCG tablet; Take 1 tablet (175 mcg total) by mouth once daily.  Dispense: 90 tablet; Refill: 3    Essential hypertension  Comments:  Pressure doing better now. will continue with bystolic.   Orders:  -     amLODIPine (NORVASC) 10 MG tablet; Take 1 tablet (10 mg total) by mouth once daily.  Dispense: 90 tablet; Refill: 3  -     hydroCHLOROthiazide (HYDRODIURIL) 25 MG tablet; Take 1 tablet (25 mg total) by mouth once daily.  Dispense: 90 tablet; Refill: 3  -     nebivoloL (BYSTOLIC) 20 mg Tab; Take 20 mg by mouth once daily.  Dispense: 90 tablet; Refill: 3  -     olmesartan (BENICAR) 40 MG tablet; Take 1 tablet (40 mg total) by mouth once daily.  Dispense: 90 tablet; Refill: 3  -     terazosin (HYTRIN) 5 MG capsule; Take 1 capsule (5 mg total) by mouth every evening.  Dispense: 90 capsule; Refill: 3    Gastroesophageal reflux disease  -     omeprazole (PRILOSEC) 20 MG capsule; Take 1 capsule (20 mg total) by mouth once daily.  Dispense: 90 capsule; Refill: 3    Anxiety disorder, unspecified type    Spondylosis without myelopathy or radiculopathy, lumbosacral region    Idiopathic gout of multiple sites, unspecified chronicity  -     allopurinoL (ZYLOPRIM) 300 MG tablet; Take 1 tablet (300 mg total) by mouth once daily.  Dispense: 90 tablet; Refill: 3    Special screening for malignant neoplasm of prostate  -     PSA, Screening; Future; Expected date: 08/16/2023    Special screening for malignant neoplasms, colon  -     Ambulatory referral/consult to Gastroenterology; Future; Expected date: 08/23/2023  Refer to Dr. Naranjo colonoscopy    Follow up in about 3 months (around 11/16/2023), or Nat[place], for Diabetic Check-Up.        8/17/2023 Kenneth Wyatt

## 2023-11-29 ENCOUNTER — TELEPHONE (OUTPATIENT)
Dept: FAMILY MEDICINE | Facility: CLINIC | Age: 73
End: 2023-11-29

## 2023-12-12 DIAGNOSIS — I10 ESSENTIAL HYPERTENSION: ICD-10-CM

## 2023-12-12 RX ORDER — NEBIVOLOL 20 MG/1
20 TABLET ORAL DAILY
Qty: 90 TABLET | Refills: 3 | Status: SHIPPED | OUTPATIENT
Start: 2023-12-12 | End: 2024-03-18 | Stop reason: SDUPTHER

## 2023-12-12 NOTE — TELEPHONE ENCOUNTER
----- Message from Rosaura Cat MA sent at 12/12/2023  8:58 AM CST -----    ----- Message -----  From: Dottie Hatch  Sent: 12/12/2023   8:51 AM CST  To: Kenneth Wyatt Staff    Pt needs refill on bystolic   [facility]   [phone]

## 2023-12-15 ENCOUNTER — OFFICE VISIT (OUTPATIENT)
Dept: FAMILY MEDICINE | Facility: CLINIC | Age: 73
End: 2023-12-15
Payer: MEDICARE

## 2023-12-15 ENCOUNTER — TELEPHONE (OUTPATIENT)
Dept: FAMILY MEDICINE | Facility: CLINIC | Age: 73
End: 2023-12-15

## 2023-12-15 VITALS
OXYGEN SATURATION: 95 % | DIASTOLIC BLOOD PRESSURE: 62 MMHG | WEIGHT: 227 LBS | HEART RATE: 50 BPM | HEIGHT: 71 IN | BODY MASS INDEX: 31.78 KG/M2 | SYSTOLIC BLOOD PRESSURE: 138 MMHG

## 2023-12-15 DIAGNOSIS — I10 ESSENTIAL HYPERTENSION: Primary | ICD-10-CM

## 2023-12-15 DIAGNOSIS — E11.9 TYPE 2 DIABETES MELLITUS WITHOUT COMPLICATION, WITHOUT LONG-TERM CURRENT USE OF INSULIN: ICD-10-CM

## 2023-12-15 DIAGNOSIS — E03.9 HYPOTHYROIDISM, UNSPECIFIED TYPE: ICD-10-CM

## 2023-12-15 LAB
ALBUMIN SERPL-MCNC: 4.5 G/DL (ref 3.6–5.1)
ALBUMIN/GLOB SERPL: 2 (CALC) (ref 1–2.5)
ALP SERPL-CCNC: 87 U/L (ref 35–144)
ALT SERPL-CCNC: 38 U/L (ref 9–46)
APPEARANCE UR: CLEAR
AST SERPL-CCNC: 24 U/L (ref 10–35)
BACTERIA #/AREA URNS HPF: NORMAL /HPF
BACTERIA UR CULT: NORMAL
BASOPHILS # BLD AUTO: 103 CELLS/UL (ref 0–200)
BASOPHILS NFR BLD AUTO: 1.1 %
BILIRUB SERPL-MCNC: 0.7 MG/DL (ref 0.2–1.2)
BILIRUB UR QL STRIP: NEGATIVE
BUN SERPL-MCNC: 22 MG/DL (ref 7–25)
BUN/CREAT SERPL: ABNORMAL (CALC) (ref 6–22)
CALCIUM SERPL-MCNC: 9.9 MG/DL (ref 8.6–10.3)
CHLORIDE SERPL-SCNC: 99 MMOL/L (ref 98–110)
CHOLEST SERPL-MCNC: 133 MG/DL
CHOLEST/HDLC SERPL: 3.1 (CALC)
CO2 SERPL-SCNC: 31 MMOL/L (ref 20–32)
COLOR UR: YELLOW
CREAT SERPL-MCNC: 1.06 MG/DL (ref 0.7–1.28)
EGFR: 74 ML/MIN/1.73M2
EOSINOPHIL # BLD AUTO: 395 CELLS/UL (ref 15–500)
EOSINOPHIL NFR BLD AUTO: 4.2 %
ERYTHROCYTE [DISTWIDTH] IN BLOOD BY AUTOMATED COUNT: 12.3 % (ref 11–15)
GLOBULIN SER CALC-MCNC: 2.3 G/DL (CALC) (ref 1.9–3.7)
GLUCOSE SERPL-MCNC: 160 MG/DL (ref 65–99)
GLUCOSE UR QL STRIP: NEGATIVE
HBA1C MFR BLD: 7.5 % OF TOTAL HGB
HCT VFR BLD AUTO: 46.9 % (ref 38.5–50)
HDLC SERPL-MCNC: 43 MG/DL
HGB BLD-MCNC: 15.4 G/DL (ref 13.2–17.1)
HGB UR QL STRIP: NEGATIVE
HYALINE CASTS #/AREA URNS LPF: NORMAL /LPF
KETONES UR QL STRIP: NEGATIVE
LDLC SERPL CALC-MCNC: 66 MG/DL (CALC)
LEUKOCYTE ESTERASE UR QL STRIP: NEGATIVE
LYMPHOCYTES # BLD AUTO: 2547 CELLS/UL (ref 850–3900)
LYMPHOCYTES NFR BLD AUTO: 27.1 %
MCH RBC QN AUTO: 29.5 PG (ref 27–33)
MCHC RBC AUTO-ENTMCNC: 32.8 G/DL (ref 32–36)
MCV RBC AUTO: 89.8 FL (ref 80–100)
MONOCYTES # BLD AUTO: 827 CELLS/UL (ref 200–950)
MONOCYTES NFR BLD AUTO: 8.8 %
NEUTROPHILS # BLD AUTO: 5527 CELLS/UL (ref 1500–7800)
NEUTROPHILS NFR BLD AUTO: 58.8 %
NITRITE UR QL STRIP: NEGATIVE
NONHDLC SERPL-MCNC: 90 MG/DL (CALC)
PH UR STRIP: 5.5 [PH] (ref 5–8)
PLATELET # BLD AUTO: 199 THOUSAND/UL (ref 140–400)
PMV BLD REES-ECKER: 12.5 FL (ref 7.5–12.5)
POTASSIUM SERPL-SCNC: 4.6 MMOL/L (ref 3.5–5.3)
PROT SERPL-MCNC: 6.8 G/DL (ref 6.1–8.1)
PROT UR QL STRIP: NEGATIVE
PSA SERPL-MCNC: 2.07 NG/ML
RBC # BLD AUTO: 5.22 MILLION/UL (ref 4.2–5.8)
RBC #/AREA URNS HPF: NORMAL /HPF
SERVICE CMNT-IMP: NORMAL
SODIUM SERPL-SCNC: 139 MMOL/L (ref 135–146)
SP GR UR STRIP: 1.02 (ref 1–1.03)
SQUAMOUS #/AREA URNS HPF: NORMAL /HPF
T4 FREE SERPL-MCNC: 1.5 NG/DL (ref 0.8–1.8)
TRIGL SERPL-MCNC: 158 MG/DL
TSH SERPL-ACNC: 0.18 MIU/L (ref 0.4–4.5)
WBC # BLD AUTO: 9.4 THOUSAND/UL (ref 3.8–10.8)
WBC #/AREA URNS HPF: NORMAL /HPF

## 2023-12-15 PROCEDURE — 3051F PR MOST RECENT HEMOGLOBIN A1C LEVEL 7.0 - < 8.0%: ICD-10-PCS | Mod: CPTII,S$GLB,, | Performed by: PHYSICIAN ASSISTANT

## 2023-12-15 PROCEDURE — 3075F PR MOST RECENT SYSTOLIC BLOOD PRESS GE 130-139MM HG: ICD-10-PCS | Mod: CPTII,S$GLB,, | Performed by: PHYSICIAN ASSISTANT

## 2023-12-15 PROCEDURE — 3051F HG A1C>EQUAL 7.0%<8.0%: CPT | Mod: CPTII,S$GLB,, | Performed by: PHYSICIAN ASSISTANT

## 2023-12-15 PROCEDURE — 1101F PR PT FALLS ASSESS DOC 0-1 FALLS W/OUT INJ PAST YR: ICD-10-PCS | Mod: CPTII,S$GLB,, | Performed by: PHYSICIAN ASSISTANT

## 2023-12-15 PROCEDURE — 1101F PT FALLS ASSESS-DOCD LE1/YR: CPT | Mod: CPTII,S$GLB,, | Performed by: PHYSICIAN ASSISTANT

## 2023-12-15 PROCEDURE — 3008F BODY MASS INDEX DOCD: CPT | Mod: CPTII,S$GLB,, | Performed by: PHYSICIAN ASSISTANT

## 2023-12-15 PROCEDURE — 3288F PR FALLS RISK ASSESSMENT DOCUMENTED: ICD-10-PCS | Mod: CPTII,S$GLB,, | Performed by: PHYSICIAN ASSISTANT

## 2023-12-15 PROCEDURE — 3078F PR MOST RECENT DIASTOLIC BLOOD PRESSURE < 80 MM HG: ICD-10-PCS | Mod: CPTII,S$GLB,, | Performed by: PHYSICIAN ASSISTANT

## 2023-12-15 PROCEDURE — 3288F FALL RISK ASSESSMENT DOCD: CPT | Mod: CPTII,S$GLB,, | Performed by: PHYSICIAN ASSISTANT

## 2023-12-15 PROCEDURE — 3078F DIAST BP <80 MM HG: CPT | Mod: CPTII,S$GLB,, | Performed by: PHYSICIAN ASSISTANT

## 2023-12-15 PROCEDURE — 3008F PR BODY MASS INDEX (BMI) DOCUMENTED: ICD-10-PCS | Mod: CPTII,S$GLB,, | Performed by: PHYSICIAN ASSISTANT

## 2023-12-15 PROCEDURE — 1159F MED LIST DOCD IN RCRD: CPT | Mod: CPTII,S$GLB,, | Performed by: PHYSICIAN ASSISTANT

## 2023-12-15 PROCEDURE — 3075F SYST BP GE 130 - 139MM HG: CPT | Mod: CPTII,S$GLB,, | Performed by: PHYSICIAN ASSISTANT

## 2023-12-15 PROCEDURE — 4010F ACE/ARB THERAPY RXD/TAKEN: CPT | Mod: CPTII,S$GLB,, | Performed by: PHYSICIAN ASSISTANT

## 2023-12-15 PROCEDURE — 99214 OFFICE O/P EST MOD 30 MIN: CPT | Mod: S$GLB,,, | Performed by: PHYSICIAN ASSISTANT

## 2023-12-15 PROCEDURE — 1159F PR MEDICATION LIST DOCUMENTED IN MEDICAL RECORD: ICD-10-PCS | Mod: CPTII,S$GLB,, | Performed by: PHYSICIAN ASSISTANT

## 2023-12-15 PROCEDURE — 99214 PR OFFICE/OUTPT VISIT, EST, LEVL IV, 30-39 MIN: ICD-10-PCS | Mod: S$GLB,,, | Performed by: PHYSICIAN ASSISTANT

## 2023-12-15 PROCEDURE — 4010F PR ACE/ARB THEARPY RXD/TAKEN: ICD-10-PCS | Mod: CPTII,S$GLB,, | Performed by: PHYSICIAN ASSISTANT

## 2023-12-15 NOTE — PROGRESS NOTES
SUBJECTIVE:    Patient ID: Richy Hdz is a 72 y.o. male.    Chief Complaint: Diabetes (No bottles//Pt is here for a 3 month check up//Decline flu vaccine//Pt will scheduled eye exam later//GLADYS )    Richy Hdz is a 72 y.o. male presenting for a 3 month diabetic follow up. A1c today is 7.5%, mild increase from last visit at 7.2%. He states he has not been eating very healthy. Lots of fast food. Planning on eating healthier to help lose weight. Has been having an issue with occasionally forgetting to take his evening medications. Planning on possibly setting an alarm so he can remember.  Overall he reports he has been well. No complaints of pain or discomfort today. Denies chest pain or shortness of breath. Denies exertional symptoms. Able to undergo ADLs without limitations. Labs reviewed today. PSA and lipids are normal.     Diabetes  Pertinent negatives for hypoglycemia include no confusion, dizziness or headaches. Pertinent negatives for diabetes include no chest pain and no fatigue.       Office Visit on 08/16/2023   Component Date Value Ref Range Status    Hemoglobin A1C, POC 08/16/2023 7.2  % Final    WBC 12/14/2023 9.4  3.8 - 10.8 Thousand/uL Final    RBC 12/14/2023 5.22  4.20 - 5.80 Million/uL Final    Hemoglobin 12/14/2023 15.4  13.2 - 17.1 g/dL Final    Hematocrit 12/14/2023 46.9  38.5 - 50.0 % Final    MCV 12/14/2023 89.8  80.0 - 100.0 fL Final    MCH 12/14/2023 29.5  27.0 - 33.0 pg Final    MCHC 12/14/2023 32.8  32.0 - 36.0 g/dL Final    RDW 12/14/2023 12.3  11.0 - 15.0 % Final    Platelets 12/14/2023 199  140 - 400 Thousand/uL Final    MPV 12/14/2023 12.5  7.5 - 12.5 fL Final    Neutrophils, Abs 12/14/2023 5,527  1,500 - 7,800 cells/uL Final    Lymph # 12/14/2023 2,547  850 - 3,900 cells/uL Final    Mono # 12/14/2023 827  200 - 950 cells/uL Final    Eos # 12/14/2023 395  15 - 500 cells/uL Final    Baso # 12/14/2023 103  0 - 200 cells/uL Final    Neutrophils Relative 12/14/2023 58.8  %  Final    Lymph % 12/14/2023 27.1  % Final    Mono % 12/14/2023 8.8  % Final    Eosinophil % 12/14/2023 4.2  % Final    Basophil % 12/14/2023 1.1  % Final    Glucose 12/14/2023 160 (H)  65 - 99 mg/dL Final    BUN 12/14/2023 22  7 - 25 mg/dL Final    Creatinine 12/14/2023 1.06  0.70 - 1.28 mg/dL Final    eGFR 12/14/2023 74  > OR = 60 mL/min/1.73m2 Final    BUN/Creatinine Ratio 12/14/2023 SEE NOTE:  6 - 22 (calc) Final    Sodium 12/14/2023 139  135 - 146 mmol/L Final    Potassium 12/14/2023 4.6  3.5 - 5.3 mmol/L Final    Chloride 12/14/2023 99  98 - 110 mmol/L Final    CO2 12/14/2023 31  20 - 32 mmol/L Final    Calcium 12/14/2023 9.9  8.6 - 10.3 mg/dL Final    Total Protein 12/14/2023 6.8  6.1 - 8.1 g/dL Final    Albumin 12/14/2023 4.5  3.6 - 5.1 g/dL Final    Globulin, Total 12/14/2023 2.3  1.9 - 3.7 g/dL (calc) Final    Albumin/Globulin Ratio 12/14/2023 2.0  1.0 - 2.5 (calc) Final    Total Bilirubin 12/14/2023 0.7  0.2 - 1.2 mg/dL Final    Alkaline Phosphatase 12/14/2023 87  35 - 144 U/L Final    AST 12/14/2023 24  10 - 35 U/L Final    ALT 12/14/2023 38  9 - 46 U/L Final    Hemoglobin A1C 12/14/2023 7.5 (H)  <5.7 % of total Hgb Final    Cholesterol 12/14/2023 133  <200 mg/dL Final    HDL 12/14/2023 43  > OR = 40 mg/dL Final    Triglycerides 12/14/2023 158 (H)  <150 mg/dL Final    LDL Cholesterol 12/14/2023 66  mg/dL (calc) Final    HDL/Cholesterol Ratio 12/14/2023 3.1  <5.0 (calc) Final    Non HDL Chol. (LDL+VLDL) 12/14/2023 90  <130 mg/dL (calc) Final    Color, UA 12/14/2023 YELLOW  YELLOW Final    Appearance, UA 12/14/2023 CLEAR  CLEAR Final    Specific Gravity, UA 12/14/2023 1.019  1.001 - 1.035 Final    pH, UA 12/14/2023 5.5  5.0 - 8.0 Final    Glucose, UA 12/14/2023 NEGATIVE  NEGATIVE Final    Bilirubin, UA 12/14/2023 NEGATIVE  NEGATIVE Final    Ketones, UA 12/14/2023 NEGATIVE  NEGATIVE Final    Occult Blood UA 12/14/2023 NEGATIVE  NEGATIVE Final    Protein, UA 12/14/2023 NEGATIVE  NEGATIVE Final    Nitrite, UA  12/14/2023 NEGATIVE  NEGATIVE Final    Leukocytes, UA 12/14/2023 NEGATIVE  NEGATIVE Final    WBC Casts, UA 12/14/2023 NONE SEEN  < OR = 5 /HPF Final    RBC Casts, UA 12/14/2023 NONE SEEN  < OR = 2 /HPF Final    Squam Epithel, UA 12/14/2023 NONE SEEN  < OR = 5 /HPF Final    Bacteria, UA 12/14/2023 NONE SEEN  NONE SEEN /HPF Final    Hyaline Casts, UA 12/14/2023 NONE SEEN  NONE SEEN /LPF Final    Service Cmt: 12/14/2023    Final    Reflexive Urine Culture 12/14/2023    Final    TSH w/reflex to FT4 12/14/2023 0.18 (L)  0.40 - 4.50 mIU/L Final    T4, Free 12/14/2023 1.5  0.8 - 1.8 ng/dL Final    PROSTATE SPECIFIC ANTIGEN, SCR - Q* 12/14/2023 2.07  < OR = 4.00 ng/mL Final       Past Medical History:   Diagnosis Date    Back pain     Cancer     skin-ear    Gout attack     Hyperlipemia     Hypertension     Thyroid disease      Past Surgical History:   Procedure Laterality Date    JOINT REPLACEMENT      bilateral shoulder    kidney stones      SKIN CANCER EXCISION  2013    x 2 to ear.    TONSILLECTOMY      VASECTOMY       History reviewed. No pertinent family history.    Marital Status:   Alcohol History:  reports current alcohol use of about 7.0 standard drinks of alcohol per week.  Tobacco History:  reports that he has quit smoking. His smoking use included cigars. He has never used smokeless tobacco.  Drug History:  has no history on file for drug use.    Review of patient's allergies indicates:  No Known Allergies    Current Outpatient Medications:     allopurinoL (ZYLOPRIM) 300 MG tablet, Take 1 tablet (300 mg total) by mouth once daily., Disp: 90 tablet, Rfl: 3    amLODIPine (NORVASC) 10 MG tablet, Take 1 tablet (10 mg total) by mouth once daily., Disp: 90 tablet, Rfl: 3    atorvastatin (LIPITOR) 40 MG tablet, Take 1 tablet (40 mg total) by mouth once daily., Disp: 90 tablet, Rfl: 3    hydroCHLOROthiazide (HYDRODIURIL) 25 MG tablet, Take 1 tablet (25 mg total) by mouth once daily., Disp: 90 tablet, Rfl: 3     "iron,carbonyl/ascorbic acid (VITRON-C ORAL), Take by mouth. Taking at night -, Disp: , Rfl:     levothyroxine (SYNTHROID) 175 MCG tablet, Take 1 tablet (175 mcg total) by mouth once daily., Disp: 90 tablet, Rfl: 3    multivit-min-FA-lycopen-lutein 0.4 mg-300 mcg- 250 mcg Tab, Take 1 tablet by mouth once daily., Disp: , Rfl:     nateglinide (STARLIX) 60 MG tablet, Take 1 tablet (60 mg total) by mouth once daily., Disp: 90 tablet, Rfl: 3    nebivoloL (BYSTOLIC) 20 mg Tab, Take 20 mg by mouth once daily., Disp: 90 tablet, Rfl: 3    olmesartan (BENICAR) 40 MG tablet, Take 1 tablet (40 mg total) by mouth once daily., Disp: 90 tablet, Rfl: 3    omeprazole (PRILOSEC) 20 MG capsule, Take 1 capsule (20 mg total) by mouth once daily., Disp: 90 capsule, Rfl: 3    terazosin (HYTRIN) 5 MG capsule, Take 1 capsule (5 mg total) by mouth every evening., Disp: 90 capsule, Rfl: 3    Review of Systems   Constitutional:  Negative for chills and fatigue.   HENT:  Negative for congestion and sore throat.    Eyes:  Negative for discharge and redness.   Respiratory:  Negative for cough and shortness of breath.    Cardiovascular:  Negative for chest pain, palpitations and leg swelling.   Gastrointestinal:  Negative for abdominal pain, constipation, diarrhea and vomiting.   Genitourinary:  Negative for dysuria and hematuria.   Musculoskeletal:  Negative for arthralgias.   Skin:  Negative for color change.   Neurological:  Negative for dizziness and headaches.   Psychiatric/Behavioral:  Negative for confusion.           Objective:      Vitals:    12/15/23 1028   BP: 138/62   Pulse: (!) 50   SpO2: 95%   Weight: 103 kg (227 lb)   Height: 5' 11" (1.803 m)     Physical Exam  Constitutional:       General: He is not in acute distress.     Appearance: He is not toxic-appearing.   HENT:      Head: Normocephalic and atraumatic.      Nose: Nose normal.   Eyes:      Extraocular Movements: Extraocular movements intact.      Conjunctiva/sclera: " Conjunctivae normal.      Pupils: Pupils are equal, round, and reactive to light.   Cardiovascular:      Rate and Rhythm: Regular rhythm. Bradycardia present.      Pulses: Normal pulses.      Heart sounds: Normal heart sounds.   Pulmonary:      Effort: Pulmonary effort is normal. No respiratory distress.      Breath sounds: Normal breath sounds. No stridor. No wheezing, rhonchi or rales.   Abdominal:      Palpations: Abdomen is soft.   Musculoskeletal:         General: Normal range of motion.      Cervical back: Normal range of motion and neck supple.      Right lower leg: No edema.      Left lower leg: No edema.   Skin:     General: Skin is warm and dry.      Capillary Refill: Capillary refill takes less than 2 seconds.      Coloration: Skin is not pale.   Neurological:      General: No focal deficit present.      Mental Status: He is alert and oriented to person, place, and time.   Psychiatric:         Mood and Affect: Mood normal.         Behavior: Behavior normal.           Assessment:       1. Essential hypertension    2. Hypothyroidism, unspecified type    3. Type 2 diabetes mellitus without complication, without long-term current use of insulin         Plan:       Essential hypertension  Comments:  stable today. Continue as is    Hypothyroidism, unspecified type  Comments:  TSH- 0.18, T4- 1.5    Type 2 diabetes mellitus without complication, without long-term current use of insulin  Comments:  slightly higher a1c today at 7.5%. will need to really monitor closely with the diet. recheck a1c in 3-4 mos.      Follow up in about 4 months (around 4/15/2024).        12/15/2023 Adolfo Mae PA-C

## 2024-03-18 DIAGNOSIS — I10 ESSENTIAL HYPERTENSION: ICD-10-CM

## 2024-03-18 RX ORDER — NEBIVOLOL 20 MG/1
20 TABLET ORAL DAILY
Qty: 90 TABLET | Refills: 3 | Status: SHIPPED | OUTPATIENT
Start: 2024-03-18 | End: 2025-03-18

## 2024-03-18 NOTE — TELEPHONE ENCOUNTER
----- Message from Opal Kuhn LPN sent at 3/18/2024 10:44 AM CDT -----    ----- Message -----  From: Marina Black  Sent: 3/18/2024  10:27 AM CDT  To: Kenneth Wyatt Staff    Pt have a prescription for bystolic accidentally sent to [facility] and should have been sent to [facility]. Please do not send pt prescriptions to [facility].   [phone]

## 2024-03-28 ENCOUNTER — PATIENT MESSAGE (OUTPATIENT)
Dept: ADMINISTRATIVE | Facility: HOSPITAL | Age: 74
End: 2024-03-28

## 2024-04-16 ENCOUNTER — TELEPHONE (OUTPATIENT)
Dept: FAMILY MEDICINE | Facility: CLINIC | Age: 74
End: 2024-04-16
Payer: MEDICARE

## 2024-04-16 DIAGNOSIS — I10 ESSENTIAL HYPERTENSION: ICD-10-CM

## 2024-04-16 DIAGNOSIS — E11.9 TYPE 2 DIABETES MELLITUS WITHOUT COMPLICATION, WITHOUT LONG-TERM CURRENT USE OF INSULIN: ICD-10-CM

## 2024-04-16 DIAGNOSIS — E03.9 HYPOTHYROIDISM, UNSPECIFIED TYPE: ICD-10-CM

## 2024-04-16 DIAGNOSIS — E78.5 HYPERLIPIDEMIA, UNSPECIFIED HYPERLIPIDEMIA TYPE: ICD-10-CM

## 2024-04-16 DIAGNOSIS — Z79.899 ENCOUNTER FOR LONG-TERM (CURRENT) USE OF OTHER MEDICATIONS: Primary | ICD-10-CM

## 2024-04-30 ENCOUNTER — OFFICE VISIT (OUTPATIENT)
Dept: FAMILY MEDICINE | Facility: CLINIC | Age: 74
End: 2024-04-30
Payer: MEDICARE

## 2024-04-30 VITALS
RESPIRATION RATE: 18 BRPM | BODY MASS INDEX: 31.92 KG/M2 | HEIGHT: 71 IN | DIASTOLIC BLOOD PRESSURE: 70 MMHG | SYSTOLIC BLOOD PRESSURE: 136 MMHG | OXYGEN SATURATION: 96 % | HEART RATE: 50 BPM | WEIGHT: 228 LBS

## 2024-04-30 DIAGNOSIS — E78.5 HYPERLIPIDEMIA, UNSPECIFIED HYPERLIPIDEMIA TYPE: ICD-10-CM

## 2024-04-30 DIAGNOSIS — K21.9 GASTROESOPHAGEAL REFLUX DISEASE WITHOUT ESOPHAGITIS: ICD-10-CM

## 2024-04-30 DIAGNOSIS — F41.9 ANXIETY DISORDER, UNSPECIFIED TYPE: ICD-10-CM

## 2024-04-30 DIAGNOSIS — E11.9 TYPE 2 DIABETES MELLITUS WITHOUT COMPLICATION, WITHOUT LONG-TERM CURRENT USE OF INSULIN: Primary | ICD-10-CM

## 2024-04-30 DIAGNOSIS — I10 ESSENTIAL HYPERTENSION: ICD-10-CM

## 2024-04-30 LAB — HBA1C MFR BLD: 7.7 %

## 2024-04-30 PROCEDURE — 3288F FALL RISK ASSESSMENT DOCD: CPT | Mod: CPTII,S$GLB,, | Performed by: PHYSICIAN ASSISTANT

## 2024-04-30 PROCEDURE — 3078F DIAST BP <80 MM HG: CPT | Mod: CPTII,S$GLB,, | Performed by: PHYSICIAN ASSISTANT

## 2024-04-30 PROCEDURE — 1159F MED LIST DOCD IN RCRD: CPT | Mod: CPTII,S$GLB,, | Performed by: PHYSICIAN ASSISTANT

## 2024-04-30 PROCEDURE — 1126F AMNT PAIN NOTED NONE PRSNT: CPT | Mod: CPTII,S$GLB,, | Performed by: PHYSICIAN ASSISTANT

## 2024-04-30 PROCEDURE — 83036 HEMOGLOBIN GLYCOSYLATED A1C: CPT | Mod: QW,,, | Performed by: PHYSICIAN ASSISTANT

## 2024-04-30 PROCEDURE — 3075F SYST BP GE 130 - 139MM HG: CPT | Mod: CPTII,S$GLB,, | Performed by: PHYSICIAN ASSISTANT

## 2024-04-30 PROCEDURE — 1101F PT FALLS ASSESS-DOCD LE1/YR: CPT | Mod: CPTII,S$GLB,, | Performed by: PHYSICIAN ASSISTANT

## 2024-04-30 PROCEDURE — 99214 OFFICE O/P EST MOD 30 MIN: CPT | Mod: S$GLB,,, | Performed by: PHYSICIAN ASSISTANT

## 2024-04-30 NOTE — PROGRESS NOTES
SUBJECTIVE:    Patient ID: Richy Hdz is a 73 y.o. male.    Chief Complaint: Follow-up (No bottles// no refills needed//pt states he have no complaints today//pt is due for foot and eye exam )    This is a 74 yo male who presents for regular checkup and refills. Reports that he has been doing pretty well overall. Planning to head up to the [facility] and looking forward to this. A1c today is 7.7% and not at goal, up from 7.5% in Dec. He is having a hard time with nateglinide and being able to take this with meals. Open to switching to jardiance once daily.     Follow-up  Pertinent negatives include no abdominal pain, arthralgias, chest pain, chills, congestion, coughing, fatigue, headaches, sore throat or vomiting.       No visits with results within 6 Month(s) from this visit.   Latest known visit with results is:   Office Visit on 08/16/2023   Component Date Value Ref Range Status    Hemoglobin A1C, POC 08/16/2023 7.2  % Final    WBC 12/14/2023 9.4  3.8 - 10.8 Thousand/uL Final    RBC 12/14/2023 5.22  4.20 - 5.80 Million/uL Final    Hemoglobin 12/14/2023 15.4  13.2 - 17.1 g/dL Final    Hematocrit 12/14/2023 46.9  38.5 - 50.0 % Final    MCV 12/14/2023 89.8  80.0 - 100.0 fL Final    MCH 12/14/2023 29.5  27.0 - 33.0 pg Final    MCHC 12/14/2023 32.8  32.0 - 36.0 g/dL Final    RDW 12/14/2023 12.3  11.0 - 15.0 % Final    Platelets 12/14/2023 199  140 - 400 Thousand/uL Final    MPV 12/14/2023 12.5  7.5 - 12.5 fL Final    Neutrophils, Abs 12/14/2023 5,527  1,500 - 7,800 cells/uL Final    Lymph # 12/14/2023 2,547  850 - 3,900 cells/uL Final    Mono # 12/14/2023 827  200 - 950 cells/uL Final    Eos # 12/14/2023 395  15 - 500 cells/uL Final    Baso # 12/14/2023 103  0 - 200 cells/uL Final    Neutrophils Relative 12/14/2023 58.8  % Final    Lymph % 12/14/2023 27.1  % Final    Mono % 12/14/2023 8.8  % Final    Eosinophil % 12/14/2023 4.2  % Final    Basophil % 12/14/2023 1.1  % Final    Glucose 12/14/2023 160 (H)   65 - 99 mg/dL Final    BUN 12/14/2023 22  7 - 25 mg/dL Final    Creatinine 12/14/2023 1.06  0.70 - 1.28 mg/dL Final    eGFR 12/14/2023 74  > OR = 60 mL/min/1.73m2 Final    BUN/Creatinine Ratio 12/14/2023 SEE NOTE:  6 - 22 (calc) Final    Sodium 12/14/2023 139  135 - 146 mmol/L Final    Potassium 12/14/2023 4.6  3.5 - 5.3 mmol/L Final    Chloride 12/14/2023 99  98 - 110 mmol/L Final    CO2 12/14/2023 31  20 - 32 mmol/L Final    Calcium 12/14/2023 9.9  8.6 - 10.3 mg/dL Final    Total Protein 12/14/2023 6.8  6.1 - 8.1 g/dL Final    Albumin 12/14/2023 4.5  3.6 - 5.1 g/dL Final    Globulin, Total 12/14/2023 2.3  1.9 - 3.7 g/dL (calc) Final    Albumin/Globulin Ratio 12/14/2023 2.0  1.0 - 2.5 (calc) Final    Total Bilirubin 12/14/2023 0.7  0.2 - 1.2 mg/dL Final    Alkaline Phosphatase 12/14/2023 87  35 - 144 U/L Final    AST 12/14/2023 24  10 - 35 U/L Final    ALT 12/14/2023 38  9 - 46 U/L Final    Hemoglobin A1C 12/14/2023 7.5 (H)  <5.7 % of total Hgb Final    Cholesterol 12/14/2023 133  <200 mg/dL Final    HDL 12/14/2023 43  > OR = 40 mg/dL Final    Triglycerides 12/14/2023 158 (H)  <150 mg/dL Final    LDL Cholesterol 12/14/2023 66  mg/dL (calc) Final    HDL/Cholesterol Ratio 12/14/2023 3.1  <5.0 (calc) Final    Non HDL Chol. (LDL+VLDL) 12/14/2023 90  <130 mg/dL (calc) Final    Color, UA 12/14/2023 YELLOW  YELLOW Final    Appearance, UA 12/14/2023 CLEAR  CLEAR Final    Specific Gravity, UA 12/14/2023 1.019  1.001 - 1.035 Final    pH, UA 12/14/2023 5.5  5.0 - 8.0 Final    Glucose, UA 12/14/2023 NEGATIVE  NEGATIVE Final    Bilirubin, UA 12/14/2023 NEGATIVE  NEGATIVE Final    Ketones, UA 12/14/2023 NEGATIVE  NEGATIVE Final    Occult Blood UA 12/14/2023 NEGATIVE  NEGATIVE Final    Protein, UA 12/14/2023 NEGATIVE  NEGATIVE Final    Nitrite, UA 12/14/2023 NEGATIVE  NEGATIVE Final    Leukocytes, UA 12/14/2023 NEGATIVE  NEGATIVE Final    WBC Casts, UA 12/14/2023 NONE SEEN  < OR = 5 /HPF Final    RBC Casts, UA 12/14/2023 NONE  SEEN  < OR = 2 /HPF Final    Squam Epithel, UA 12/14/2023 NONE SEEN  < OR = 5 /HPF Final    Bacteria, UA 12/14/2023 NONE SEEN  NONE SEEN /HPF Final    Hyaline Casts, UA 12/14/2023 NONE SEEN  NONE SEEN /LPF Final    Service Cmt: 12/14/2023    Final    Reflexive Urine Culture 12/14/2023    Final    TSH w/reflex to FT4 12/14/2023 0.18 (L)  0.40 - 4.50 mIU/L Final    T4, Free 12/14/2023 1.5  0.8 - 1.8 ng/dL Final    PROSTATE SPECIFIC ANTIGEN, SCR - Q* 12/14/2023 2.07  < OR = 4.00 ng/mL Final       Past Medical History:   Diagnosis Date    Back pain     Cancer     skin-ear    Gout attack     Hyperlipemia     Hypertension     Thyroid disease      Past Surgical History:   Procedure Laterality Date    JOINT REPLACEMENT      bilateral shoulder    kidney stones      SKIN CANCER EXCISION  2013    x 2 to ear.    TONSILLECTOMY      VASECTOMY       No family history on file.    Marital Status:   Alcohol History:  reports current alcohol use of about 7.0 standard drinks of alcohol per week.  Tobacco History:  reports that he has quit smoking. His smoking use included cigars. He has never used smokeless tobacco.  Drug History:  has no history on file for drug use.    Review of patient's allergies indicates:  No Known Allergies    Current Outpatient Medications:     allopurinoL (ZYLOPRIM) 300 MG tablet, Take 1 tablet (300 mg total) by mouth once daily., Disp: 90 tablet, Rfl: 3    amLODIPine (NORVASC) 10 MG tablet, Take 1 tablet (10 mg total) by mouth once daily., Disp: 90 tablet, Rfl: 3    atorvastatin (LIPITOR) 40 MG tablet, Take 1 tablet (40 mg total) by mouth once daily., Disp: 90 tablet, Rfl: 3    hydroCHLOROthiazide (HYDRODIURIL) 25 MG tablet, Take 1 tablet (25 mg total) by mouth once daily., Disp: 90 tablet, Rfl: 3    levothyroxine (SYNTHROID) 175 MCG tablet, Take 1 tablet (175 mcg total) by mouth once daily., Disp: 90 tablet, Rfl: 3    nebivoloL (BYSTOLIC) 20 mg Tab, Take 20 mg by mouth once daily., Disp: 90 tablet,  "Rfl: 3    olmesartan (BENICAR) 40 MG tablet, Take 1 tablet (40 mg total) by mouth once daily., Disp: 90 tablet, Rfl: 3    omeprazole (PRILOSEC) 20 MG capsule, Take 1 capsule (20 mg total) by mouth once daily., Disp: 90 capsule, Rfl: 3    terazosin (HYTRIN) 5 MG capsule, Take 1 capsule (5 mg total) by mouth every evening., Disp: 90 capsule, Rfl: 3    empagliflozin (JARDIANCE) 25 mg tablet, Take 1 tablet (25 mg total) by mouth once daily., Disp: 90 tablet, Rfl: 1    Review of Systems   Constitutional:  Negative for chills and fatigue.   HENT:  Negative for congestion and sore throat.    Eyes:  Negative for discharge and redness.   Respiratory:  Negative for cough and shortness of breath.    Cardiovascular:  Negative for chest pain, palpitations and leg swelling.   Gastrointestinal:  Negative for abdominal pain, constipation, diarrhea and vomiting.   Genitourinary:  Negative for dysuria and hematuria.   Musculoskeletal:  Negative for arthralgias.   Skin:  Negative for color change.   Neurological:  Negative for dizziness and headaches.   Psychiatric/Behavioral:  Negative for confusion.           Objective:      Vitals:    04/30/24 0953 04/30/24 1015   BP: (!) 143/58 136/70   Pulse: (!) 47 (!) 50   Resp: 18    SpO2: 96%    Weight: 103.4 kg (228 lb)    Height: 5' 11" (1.803 m)      Physical Exam  Constitutional:       General: He is not in acute distress.     Appearance: He is not toxic-appearing.   HENT:      Head: Normocephalic and atraumatic.      Nose: Nose normal.   Eyes:      Extraocular Movements: Extraocular movements intact.      Conjunctiva/sclera: Conjunctivae normal.      Pupils: Pupils are equal, round, and reactive to light.   Cardiovascular:      Rate and Rhythm: Regular rhythm. Bradycardia present.      Pulses: Normal pulses.      Heart sounds: Normal heart sounds.   Pulmonary:      Effort: Pulmonary effort is normal. No respiratory distress.      Breath sounds: Normal breath sounds. No stridor. No " wheezing, rhonchi or rales.   Abdominal:      Palpations: Abdomen is soft.   Musculoskeletal:         General: Normal range of motion.      Cervical back: Normal range of motion and neck supple.      Right lower leg: No edema.      Left lower leg: No edema.   Skin:     General: Skin is warm and dry.      Capillary Refill: Capillary refill takes less than 2 seconds.      Coloration: Skin is not pale.   Neurological:      General: No focal deficit present.      Mental Status: He is alert and oriented to person, place, and time.   Psychiatric:         Mood and Affect: Mood normal.         Behavior: Behavior normal.           Assessment:       1. Type 2 diabetes mellitus without complication, without long-term current use of insulin    2. Essential hypertension    3. Hyperlipidemia, unspecified hyperlipidemia type    4. Gastroesophageal reflux disease without esophagitis    5. Anxiety disorder, unspecified type         Plan:       Type 2 diabetes mellitus without complication, without long-term current use of insulin  Comments:  A1c is not at goal and slightly worse. will add jardiance now to take once daily. will f/u in 3 mos.  Orders:  -     POCT HEMOGLOBIN A1C  -     Foot Exam Performed  -     empagliflozin (JARDIANCE) 25 mg tablet; Take 1 tablet (25 mg total) by mouth once daily.  Dispense: 90 tablet; Refill: 1    Essential hypertension  Comments:  BP rechecked by me and at goal.    Hyperlipidemia, unspecified hyperlipidemia type    Gastroesophageal reflux disease without esophagitis    Anxiety disorder, unspecified type  Comments:  mood and anxiety is stable at this time. continue as is.      Follow up in about 3 months (around 7/30/2024) for Diabetic Check-Up.        4/30/2024 Adolfo Mae PA-C

## 2024-05-28 DIAGNOSIS — Z00.00 ENCOUNTER FOR MEDICARE ANNUAL WELLNESS EXAM: ICD-10-CM

## 2024-06-06 ENCOUNTER — TELEPHONE (OUTPATIENT)
Dept: FAMILY MEDICINE | Facility: CLINIC | Age: 74
End: 2024-06-06
Payer: MEDICARE

## 2024-06-07 NOTE — TELEPHONE ENCOUNTER
Call patient and let him know that we will send a new rx. Highly doubt that he is out though since his LDL was 66 at the visit a few weeks back...

## 2024-06-07 NOTE — TELEPHONE ENCOUNTER
----- Message from Nano Colvin LPN sent at 2024  9:28 AM CDT -----  Regarding: FW: Statin rx - new rx needed    ----- Message -----  From: Rosaura Cat MA  Sent: 2024   8:05 AM CDT  To: Adolfo Mae Staff  Subject: FW: Statin rx - new rx needed             Nathen patient.  ----- Message -----  From: Kristan Garrison NP  Sent: 2024  12:40 AM CDT  To: Kenneth Wyatt Staff  Subject: FW: Statin rx - new rx needed             Looks like he should be out of refills for lipitor? Is he taking?   Due for eye exam. Needs referral placed  ----- Message -----  From: Dottie Lorenzana RN  Sent: 2024   2:36 PM CDT  To: Kenneth Wyatt MD  Subject: Statin rx - new rx needed                 Pts order for statin therapy has . If patient should currently be on a statin drug, please send new prescription to the pharmacy and leave end date open.    Exclusions: Intolerance needs a  code (myalgia, myositis, myopathy, or rhabdomyolysis) during the current measurement year AND problem list updated and reviewed.   G72.0  Drug-induced myopathy or rhabdomyolysis   G72.9  Myopathy, unspecified  M60.9  Myositis, unspecified  M79.10 Myalgia, unspecified site    Please review and advise.

## 2024-06-19 ENCOUNTER — TELEPHONE (OUTPATIENT)
Dept: FAMILY MEDICINE | Facility: CLINIC | Age: 74
End: 2024-06-19
Payer: MEDICARE

## 2024-06-19 NOTE — TELEPHONE ENCOUNTER
Spoke with patient about his annual eye exam - offered to help get him scheduled. He refused, states he is going to look into who he wants to see and agrees to call back if he would like us to send a referral or get him scheduled.

## 2024-08-16 ENCOUNTER — OFFICE VISIT (OUTPATIENT)
Dept: FAMILY MEDICINE | Facility: CLINIC | Age: 74
End: 2024-08-16
Payer: MEDICARE

## 2024-08-16 VITALS
OXYGEN SATURATION: 95 % | HEIGHT: 71 IN | HEART RATE: 56 BPM | DIASTOLIC BLOOD PRESSURE: 58 MMHG | RESPIRATION RATE: 18 BRPM | BODY MASS INDEX: 32.06 KG/M2 | SYSTOLIC BLOOD PRESSURE: 132 MMHG | WEIGHT: 229 LBS

## 2024-08-16 DIAGNOSIS — K21.9 GASTROESOPHAGEAL REFLUX DISEASE WITHOUT ESOPHAGITIS: ICD-10-CM

## 2024-08-16 DIAGNOSIS — M10.9 GOUT, UNSPECIFIED CAUSE, UNSPECIFIED CHRONICITY, UNSPECIFIED SITE: ICD-10-CM

## 2024-08-16 DIAGNOSIS — E11.9 TYPE 2 DIABETES MELLITUS WITHOUT COMPLICATION, WITHOUT LONG-TERM CURRENT USE OF INSULIN: Primary | ICD-10-CM

## 2024-08-16 DIAGNOSIS — I10 ESSENTIAL HYPERTENSION: ICD-10-CM

## 2024-08-16 DIAGNOSIS — E78.5 HYPERLIPIDEMIA, UNSPECIFIED HYPERLIPIDEMIA TYPE: ICD-10-CM

## 2024-08-16 NOTE — PROGRESS NOTES
SUBJECTIVE:    Patient ID: Richy Hdz is a 73 y.o. male.    Chief Complaint: Follow-up (No bottles/ no refills needed// lab results //pt states he have no complaints today //pt agrees to eye exam)    This is 72 yo male who presents for regular checkup and refills. Reports that he has been doing pretty well medically. No new concerns. Was unable to get the jardiance due to cost. He is trying to make sure that he is consistent with medications as prescribed. A1c today is 7.7 and unchanged from previous.    Follow-up  Pertinent negatives include no abdominal pain, arthralgias, chest pain, chills, congestion, coughing, fatigue, headaches, sore throat or vomiting.       Office Visit on 04/30/2024   Component Date Value Ref Range Status    Hemoglobin A1C, POC 04/30/2024 7.7  % Final   Telephone on 04/16/2024   Component Date Value Ref Range Status    TSH w/reflex to FT4 08/14/2024 0.03 (L)  0.40 - 4.50 mIU/L Final    T4, Free 08/14/2024 1.4  0.8 - 1.8 ng/dL Final    Cholesterol 08/14/2024 103  <200 mg/dL Final    HDL 08/14/2024 43  > OR = 40 mg/dL Final    Triglycerides 08/14/2024 104  <150 mg/dL Final    LDL Cholesterol 08/14/2024 41  mg/dL (calc) Final    HDL/Cholesterol Ratio 08/14/2024 2.4  <5.0 (calc) Final    Non HDL Chol. (LDL+VLDL) 08/14/2024 60  <130 mg/dL (calc) Final    WBC 08/14/2024 7.0  3.8 - 10.8 Thousand/uL Final    RBC 08/14/2024 4.56  4.20 - 5.80 Million/uL Final    Hemoglobin 08/14/2024 13.9  13.2 - 17.1 g/dL Final    Hematocrit 08/14/2024 42.9  38.5 - 50.0 % Final    MCV 08/14/2024 94.1  80.0 - 100.0 fL Final    MCH 08/14/2024 30.5  27.0 - 33.0 pg Final    MCHC 08/14/2024 32.4  32.0 - 36.0 g/dL Final    RDW 08/14/2024 12.8  11.0 - 15.0 % Final    Platelets 08/14/2024 175  140 - 400 Thousand/uL Final    MPV 08/14/2024 12.8 (H)  7.5 - 12.5 fL Final    Neutrophils, Abs 08/14/2024 4,277  1,500 - 7,800 cells/uL Final    Lymph # 08/14/2024 1,715  850 - 3,900 cells/uL Final    Mono # 08/14/2024 616   200 - 950 cells/uL Final    Eos # 08/14/2024 301  15 - 500 cells/uL Final    Baso # 08/14/2024 91  0 - 200 cells/uL Final    Neutrophils Relative 08/14/2024 61.1  % Final    Lymph % 08/14/2024 24.5  % Final    Mono % 08/14/2024 8.8  % Final    Eosinophil % 08/14/2024 4.3  % Final    Basophil % 08/14/2024 1.3  % Final    Glucose 08/14/2024 143 (H)  65 - 99 mg/dL Final    BUN 08/14/2024 23  7 - 25 mg/dL Final    Creatinine 08/14/2024 1.14  0.70 - 1.28 mg/dL Final    eGFR 08/14/2024 67  > OR = 60 mL/min/1.73m2 Final    BUN/Creatinine Ratio 08/14/2024 SEE NOTE:  6 - 22 (calc) Final    Sodium 08/14/2024 142  135 - 146 mmol/L Final    Potassium 08/14/2024 4.5  3.5 - 5.3 mmol/L Final    Chloride 08/14/2024 103  98 - 110 mmol/L Final    CO2 08/14/2024 29  20 - 32 mmol/L Final    Calcium 08/14/2024 9.3  8.6 - 10.3 mg/dL Final    Total Protein 08/14/2024 6.2  6.1 - 8.1 g/dL Final    Albumin 08/14/2024 4.1  3.6 - 5.1 g/dL Final    Globulin, Total 08/14/2024 2.1  1.9 - 3.7 g/dL (calc) Final    Albumin/Globulin Ratio 08/14/2024 2.0  1.0 - 2.5 (calc) Final    Total Bilirubin 08/14/2024 0.6  0.2 - 1.2 mg/dL Final    Alkaline Phosphatase 08/14/2024 90  35 - 144 U/L Final    AST 08/14/2024 18  10 - 35 U/L Final    ALT 08/14/2024 20  9 - 46 U/L Final    Hemoglobin A1C 08/14/2024 7.7 (H)  <5.7 % of total Hgb Final       Past Medical History:   Diagnosis Date    Back pain     Cancer     skin-ear    Gout attack     Hyperlipemia     Hypertension     Thyroid disease      Past Surgical History:   Procedure Laterality Date    JOINT REPLACEMENT      bilateral shoulder    kidney stones      SKIN CANCER EXCISION  2013    x 2 to ear.    TONSILLECTOMY      VASECTOMY       No family history on file.    Marital Status:   Alcohol History:  reports current alcohol use of about 7.0 standard drinks of alcohol per week.  Tobacco History:  reports that he has quit smoking. His smoking use included cigars. He has never used smokeless  tobacco.  Drug History:  has no history on file for drug use.    Review of patient's allergies indicates:  No Known Allergies    Current Outpatient Medications:     allopurinoL (ZYLOPRIM) 300 MG tablet, Take 1 tablet (300 mg total) by mouth once daily., Disp: 90 tablet, Rfl: 3    amLODIPine (NORVASC) 10 MG tablet, Take 1 tablet (10 mg total) by mouth once daily., Disp: 90 tablet, Rfl: 3    atorvastatin (LIPITOR) 40 MG tablet, Take 1 tablet (40 mg total) by mouth once daily., Disp: 90 tablet, Rfl: 3    hydroCHLOROthiazide (HYDRODIURIL) 25 MG tablet, Take 1 tablet (25 mg total) by mouth once daily., Disp: 90 tablet, Rfl: 3    levothyroxine (SYNTHROID) 175 MCG tablet, Take 1 tablet (175 mcg total) by mouth once daily., Disp: 90 tablet, Rfl: 3    nebivoloL (BYSTOLIC) 20 mg Tab, Take 20 mg by mouth once daily., Disp: 90 tablet, Rfl: 3    olmesartan (BENICAR) 40 MG tablet, Take 1 tablet (40 mg total) by mouth once daily., Disp: 90 tablet, Rfl: 3    omeprazole (PRILOSEC) 20 MG capsule, Take 1 capsule (20 mg total) by mouth once daily., Disp: 90 capsule, Rfl: 3    terazosin (HYTRIN) 5 MG capsule, Take 1 capsule (5 mg total) by mouth every evening., Disp: 90 capsule, Rfl: 3    empagliflozin (JARDIANCE) 25 mg tablet, Take 1 tablet (25 mg total) by mouth once daily. (Patient not taking: Reported on 8/16/2024), Disp: 90 tablet, Rfl: 1    Review of Systems   Constitutional:  Negative for chills and fatigue.   HENT:  Negative for congestion and sore throat.    Eyes:  Negative for discharge and redness.   Respiratory:  Negative for cough and shortness of breath.    Cardiovascular:  Negative for chest pain, palpitations and leg swelling.   Gastrointestinal:  Negative for abdominal pain, constipation, diarrhea and vomiting.   Genitourinary:  Negative for dysuria and hematuria.   Musculoskeletal:  Negative for arthralgias.   Skin:  Negative for color change.   Neurological:  Negative for dizziness and headaches.  "  Psychiatric/Behavioral:  Negative for confusion.           Objective:      Vitals:    08/16/24 0914   BP: (!) 132/58   Pulse: (!) 56   Resp: 18   SpO2: 95%   Weight: 103.9 kg (229 lb)   Height: 5' 11" (1.803 m)     Physical Exam  Constitutional:       General: He is not in acute distress.     Appearance: He is not toxic-appearing.   HENT:      Head: Normocephalic and atraumatic.      Nose: Nose normal.   Eyes:      Extraocular Movements: Extraocular movements intact.      Conjunctiva/sclera: Conjunctivae normal.      Pupils: Pupils are equal, round, and reactive to light.   Cardiovascular:      Rate and Rhythm: Regular rhythm. Bradycardia present.      Pulses: Normal pulses.      Heart sounds: Normal heart sounds.   Pulmonary:      Effort: Pulmonary effort is normal. No respiratory distress.      Breath sounds: Normal breath sounds. No stridor. No wheezing, rhonchi or rales.   Abdominal:      Palpations: Abdomen is soft.   Musculoskeletal:         General: Normal range of motion.      Cervical back: Normal range of motion and neck supple.      Right lower leg: No edema.      Left lower leg: No edema.   Skin:     General: Skin is warm and dry.      Capillary Refill: Capillary refill takes less than 2 seconds.      Coloration: Skin is not pale.   Neurological:      General: No focal deficit present.      Mental Status: He is alert and oriented to person, place, and time.   Psychiatric:         Mood and Affect: Mood normal.         Behavior: Behavior normal.           Assessment:       1. Type 2 diabetes mellitus without complication, without long-term current use of insulin    2. Essential hypertension    3. Gastroesophageal reflux disease without esophagitis    4. Hyperlipidemia, unspecified hyperlipidemia type    5. Gout, unspecified cause, unspecified chronicity, unspecified site         Plan:       Type 2 diabetes mellitus without complication, without long-term current use of insulin  Comments:  A1c is 7.7% and " no significant improvement since last visit. we discussed ways that he can really work on this. be consistent with meds as is. f/u 4-6 mos.  Orders:  -     Ambulatory referral/consult to Ophthalmology; Future; Expected date: 08/16/2024    Essential hypertension  Comments:  BP at goal. continue as is.    Gastroesophageal reflux disease without esophagitis    Hyperlipidemia, unspecified hyperlipidemia type    Gout, unspecified cause, unspecified chronicity, unspecified site  Comments:  NO recent gout flares. maintain allopurinol as is.      Follow up in about 6 months (around 2/16/2025) for Diabetic Check-Up, Annual Physical.        8/16/2024 Adolfo Mae PA-C

## 2024-10-15 ENCOUNTER — PATIENT MESSAGE (OUTPATIENT)
Dept: FAMILY MEDICINE | Facility: CLINIC | Age: 74
End: 2024-10-15
Payer: MEDICARE

## 2024-10-25 DIAGNOSIS — E03.9 HYPOTHYROIDISM, UNSPECIFIED TYPE: ICD-10-CM

## 2024-10-25 DIAGNOSIS — I10 ESSENTIAL HYPERTENSION: ICD-10-CM

## 2024-10-25 DIAGNOSIS — E78.5 HYPERLIPIDEMIA, UNSPECIFIED HYPERLIPIDEMIA TYPE: ICD-10-CM

## 2024-10-25 DIAGNOSIS — K21.9 GASTROESOPHAGEAL REFLUX DISEASE WITHOUT ESOPHAGITIS: ICD-10-CM

## 2024-10-25 DIAGNOSIS — M10.09 IDIOPATHIC GOUT OF MULTIPLE SITES, UNSPECIFIED CHRONICITY: ICD-10-CM

## 2024-10-25 RX ORDER — ATORVASTATIN CALCIUM 40 MG/1
40 TABLET, FILM COATED ORAL DAILY
Qty: 90 TABLET | Refills: 1 | Status: SHIPPED | OUTPATIENT
Start: 2024-10-25 | End: 2025-04-23

## 2024-10-25 RX ORDER — HYDROCHLOROTHIAZIDE 25 MG/1
25 TABLET ORAL DAILY
Qty: 90 TABLET | Refills: 1 | Status: SHIPPED | OUTPATIENT
Start: 2024-10-25 | End: 2025-04-23

## 2024-10-25 RX ORDER — LEVOTHYROXINE SODIUM 175 UG/1
175 TABLET ORAL DAILY
Qty: 90 TABLET | Refills: 1 | Status: SHIPPED | OUTPATIENT
Start: 2024-10-25 | End: 2025-04-23

## 2024-10-25 RX ORDER — OMEPRAZOLE 20 MG/1
20 CAPSULE, DELAYED RELEASE ORAL DAILY
Qty: 90 CAPSULE | Refills: 1 | Status: SHIPPED | OUTPATIENT
Start: 2024-10-25

## 2024-10-25 RX ORDER — OLMESARTAN MEDOXOMIL 40 MG/1
40 TABLET ORAL DAILY
Qty: 90 TABLET | Refills: 1 | Status: SHIPPED | OUTPATIENT
Start: 2024-10-25

## 2024-10-25 RX ORDER — AMLODIPINE BESYLATE 10 MG/1
10 TABLET ORAL DAILY
Qty: 90 TABLET | Refills: 1 | Status: SHIPPED | OUTPATIENT
Start: 2024-10-25 | End: 2025-04-23

## 2024-10-25 RX ORDER — TERAZOSIN 5 MG/1
5 CAPSULE ORAL NIGHTLY
Qty: 90 CAPSULE | Refills: 1 | Status: SHIPPED | OUTPATIENT
Start: 2024-10-25

## 2024-10-25 RX ORDER — ALLOPURINOL 300 MG/1
300 TABLET ORAL DAILY
Qty: 90 TABLET | Refills: 1 | Status: SHIPPED | OUTPATIENT
Start: 2024-10-25

## 2024-10-25 NOTE — TELEPHONE ENCOUNTER
----- Message from Dottie sent at 10/25/2024 10:54 AM CDT -----  Pt needs refill on all his meds.   [facility]   [phone]

## 2024-11-07 DIAGNOSIS — E11.9 TYPE 2 DIABETES MELLITUS WITHOUT COMPLICATION, WITHOUT LONG-TERM CURRENT USE OF INSULIN: Primary | ICD-10-CM

## 2024-11-07 RX ORDER — NATEGLINIDE 60 MG/1
60 TABLET ORAL DAILY
Qty: 90 TABLET | Refills: 3 | Status: SHIPPED | OUTPATIENT
Start: 2024-11-07 | End: 2025-11-07

## 2024-11-07 NOTE — TELEPHONE ENCOUNTER
----- Message from Shelby sent at 11/7/2024  8:50 AM CST -----  Refill Nateglinide [facility] 90 day with 3 refills. pt's # [phone] GH

## 2024-11-07 NOTE — TELEPHONE ENCOUNTER
Discontinued in chart so called pt, tried to change to jardiance but was too $$, so went back to it

## 2024-12-05 ENCOUNTER — OFFICE VISIT (OUTPATIENT)
Dept: OPTOMETRY | Facility: CLINIC | Age: 74
End: 2024-12-05
Payer: COMMERCIAL

## 2024-12-05 DIAGNOSIS — H25.13 NUCLEAR SCLEROSIS, BILATERAL: ICD-10-CM

## 2024-12-05 DIAGNOSIS — Z01.00 EXAMINATION OF EYES AND VISION: Primary | ICD-10-CM

## 2024-12-05 DIAGNOSIS — E11.9 DIABETES MELLITUS TYPE 2 WITHOUT RETINOPATHY: ICD-10-CM

## 2024-12-05 DIAGNOSIS — H52.7 REFRACTIVE ERROR: ICD-10-CM

## 2024-12-05 DIAGNOSIS — E11.9 TYPE 2 DIABETES MELLITUS WITHOUT COMPLICATION, WITHOUT LONG-TERM CURRENT USE OF INSULIN: ICD-10-CM

## 2024-12-05 PROCEDURE — 92015 DETERMINE REFRACTIVE STATE: CPT | Mod: S$GLB,,, | Performed by: OPTOMETRIST

## 2024-12-05 PROCEDURE — 99999 PR PBB SHADOW E&M-EST. PATIENT-LVL III: CPT | Mod: PBBFAC,,, | Performed by: OPTOMETRIST

## 2024-12-05 PROCEDURE — 92004 COMPRE OPH EXAM NEW PT 1/>: CPT | Mod: S$GLB,,, | Performed by: OPTOMETRIST

## 2024-12-05 NOTE — PROGRESS NOTES
HPI    Pt states can't remember when last eye exam was, at least 10-15 years  Trouble reading later in the evening, OTC readers    (-)dryness  (-)gtts  (-)headaches  (+)occasional FOL      Hemoglobin A1C       Date                     Value               Ref Range             Status                08/14/2024               7.7 (H)             <5.7 % of tota*       Final                       12/14/2023               7.5 (H)             <5.7 % of tota*       Final                      08/03/2022               8.2 (H)             <5.7 % of tota*       Final              Last edited by Eugene Posey, OD on 12/5/2024  4:11 PM.            Assessment /Plan     For exam results, see Encounter Report.    Examination of eyes and vision    Type 2 diabetes mellitus without complication, without long-term current use of insulin  Comments:  A1c is 7.7% and no significant improvement since last visit. we discussed ways that he can really work on this. be consistent with meds as is. f/u 4-6 mos.  Orders:  -     Ambulatory referral/consult to Ophthalmology    Diabetes mellitus type 2 without retinopathy    Nuclear sclerosis, bilateral    Refractive error      1. Examination of eyes and vision (Primary)    2. Type 2 diabetes mellitus without complication, without long-term current use of insulin  3. Diabetes mellitus type 2 without retinopathy  Discussed possible ocular affects of uncontrolled blood sugar with patient. Recommended continued strong blood sugar control and continued care with PCP. Monitor yearly.     4. Nuclear sclerosis, bilateral  Mild to moderate OU, not VS  Discussed possible ocular affects of cataracts. Acceptable BCVA OU.   Discussed treatment options. Surgery not recommended at this time.   Monitor yearly.     5. Refractive error  Dispensed updated spectacle Rx. Discussed various spectacle lens options. Discussed adaptation period to new specs.   Demonstrated new spec Rx vs uncorrected vision in phoropter with  patient satisfaction

## 2025-02-17 ENCOUNTER — TELEPHONE (OUTPATIENT)
Dept: FAMILY MEDICINE | Facility: CLINIC | Age: 75
End: 2025-02-17
Payer: MEDICARE

## 2025-02-17 DIAGNOSIS — E78.5 HYPERLIPIDEMIA, UNSPECIFIED HYPERLIPIDEMIA TYPE: ICD-10-CM

## 2025-02-17 DIAGNOSIS — E03.9 HYPOTHYROIDISM, UNSPECIFIED TYPE: ICD-10-CM

## 2025-02-17 DIAGNOSIS — Z12.5 SPECIAL SCREENING FOR MALIGNANT NEOPLASM OF PROSTATE: ICD-10-CM

## 2025-02-17 DIAGNOSIS — I10 ESSENTIAL HYPERTENSION: ICD-10-CM

## 2025-02-17 DIAGNOSIS — E11.9 TYPE 2 DIABETES MELLITUS WITHOUT COMPLICATION, WITHOUT LONG-TERM CURRENT USE OF INSULIN: ICD-10-CM

## 2025-02-17 DIAGNOSIS — Z79.899 ENCOUNTER FOR LONG-TERM (CURRENT) USE OF MEDICATIONS: Primary | ICD-10-CM

## 2025-02-18 ENCOUNTER — OFFICE VISIT (OUTPATIENT)
Dept: FAMILY MEDICINE | Facility: CLINIC | Age: 75
End: 2025-02-18
Payer: MEDICARE

## 2025-02-18 VITALS
HEART RATE: 54 BPM | DIASTOLIC BLOOD PRESSURE: 60 MMHG | RESPIRATION RATE: 18 BRPM | HEIGHT: 71 IN | SYSTOLIC BLOOD PRESSURE: 138 MMHG | WEIGHT: 235 LBS | OXYGEN SATURATION: 96 % | BODY MASS INDEX: 32.9 KG/M2

## 2025-02-18 DIAGNOSIS — E11.9 TYPE 2 DIABETES MELLITUS WITHOUT COMPLICATION, WITHOUT LONG-TERM CURRENT USE OF INSULIN: ICD-10-CM

## 2025-02-18 DIAGNOSIS — I10 ESSENTIAL HYPERTENSION: ICD-10-CM

## 2025-02-18 DIAGNOSIS — E78.5 HYPERLIPIDEMIA, UNSPECIFIED HYPERLIPIDEMIA TYPE: ICD-10-CM

## 2025-02-18 DIAGNOSIS — M25.512 LEFT SHOULDER PAIN, UNSPECIFIED CHRONICITY: Primary | ICD-10-CM

## 2025-02-18 DIAGNOSIS — E03.9 HYPOTHYROIDISM, UNSPECIFIED TYPE: ICD-10-CM

## 2025-02-18 DIAGNOSIS — K21.9 GASTROESOPHAGEAL REFLUX DISEASE WITHOUT ESOPHAGITIS: ICD-10-CM

## 2025-02-18 LAB — HBA1C MFR BLD: 7.9 %

## 2025-02-18 NOTE — PROGRESS NOTES
SUBJECTIVE:    Patient ID: Richy Hdz is a 74 y.o. male.    Chief Complaint: Annual Exam (Annual visit// no bottles// no refills needed// pt states he's been having pain level 3 in left arm going on for 1 week mostly at night//pt refused flu vaccine )    History of Present Illness    CHIEF COMPLAINT:  Richy presents today for right arm pain.    RIGHT ARM PAIN:  He reports right arm pain that started about a week ago without injury or trauma. The pain is rated 3/10, worsening at night with limited range of motion, particularly with elevation. He has difficulty lying on the affected arm, which triggers severe pain throughout the entire arm. He denies numbness, tingling, or burning sensations. The pain is described as feeling like swelling and bone pain.    SURGICAL HISTORY:  He has a history of bilateral shoulder replacements - right shoulder in 2013 and left shoulder in 2014.    HYPERTENSION:  His recent morning blood pressure reading was 136/72. He is currently taking Bistolic, Olmesartan, Terazosin, HCTZ, and Amlodipine for blood pressure management.    DIABETES MANAGEMENT:  He reports dietary non-compliance with excessive snacking, particularly consuming large quantities of potato chips and crackers over short periods. He acknowledges difficulty with portion control when snack foods are readily available in the home.      ROS:  General: -fever, -chills, -fatigue, -weight gain, -weight loss  Eyes: -vision changes, -redness, -discharge  ENT: -ear pain, -nasal congestion, -sore throat  Cardiovascular: -chest pain, -palpitations, -lower extremity edema  Respiratory: -cough, -shortness of breath  Gastrointestinal: -abdominal pain, -nausea, -vomiting, -diarrhea, -constipation, -blood in stool  Genitourinary: -dysuria, -hematuria, -frequency  Musculoskeletal: -joint pain, +muscle pain  Skin: -rash, -lesion  Neurological: -headache, -dizziness, -numbness, -tingling  Psychiatric: -anxiety, -depression, -sleep  "difficulty         No visits with results within 6 Month(s) from this visit.   Latest known visit with results is:   Office Visit on 04/30/2024   Component Date Value Ref Range Status    Hemoglobin A1C, POC 04/30/2024 7.7  % Final       Past Medical History:   Diagnosis Date    Back pain     Cancer     skin-ear    Gout attack     Hyperlipemia     Hypertension     Thyroid disease      Past Surgical History:   Procedure Laterality Date    JOINT REPLACEMENT      bilateral shoulder    kidney stones      SKIN CANCER EXCISION  2013    x 2 to ear.    TONSILLECTOMY      VASECTOMY       Family History   Problem Relation Name Age of Onset    Glaucoma Neg Hx      Macular degeneration Neg Hx         Marital Status:   Alcohol History:  reports current alcohol use of about 7.0 standard drinks of alcohol per week.  Tobacco History:  reports that he has quit smoking. His smoking use included cigars. He has never used smokeless tobacco.  Drug History:  has no history on file for drug use.    Review of patient's allergies indicates:  No Known Allergies  Current Medications[1]    Objective:      Vitals:    02/18/25 0947 02/18/25 0949 02/18/25 1013   BP: (!) 156/60 (!) 150/60 138/60   Pulse: (!) 50  (!) 54   Resp: 18     SpO2: 96%     Weight: 106.6 kg (235 lb)     Height: 5' 11" (1.803 m)       Physical Exam    Vitals: Blood pressure: 136/72.  General: No acute distress. Well-developed. Well-nourished.  Eyes: EOMI. Sclerae anicteric.  HENT: Normocephalic. Atraumatic. Nares patent. Moist oral mucosa.  Ears: Bilateral TMs clear. Bilateral EACs clear.  Cardiovascular: Regular rate. Regular rhythm. No murmurs. No rubs. No gallops. Normal S1, S2.  Respiratory: Normal respiratory effort. Clear to auscultation bilaterally. No rales. No rhonchi. No wheezing.  Abdomen: Soft. Non-tender. Non-distended. Normoactive bowel sounds.  Musculoskeletal: No  obvious deformity. Limited range of motion in arm. Pain on arm elevation. Pain on empty " can test. Pain on reaching outer region.  Extremities: No lower extremity edema.  Neurological: Alert & oriented x3. No slurred speech. Normal gait.  Psychiatric: Normal mood. Normal affect. Good insight. Good judgment.  Skin: Warm. Dry. No rash.         Assessment:       Assessment & Plan    - Evaluated arm pain, suspecting potential rotator cuff tendinitis  - Noted increase in A1c from 6.7 to 7.9, indicating need for tighter glucose control  - Assessed blood pressure management, currently on 5 antihypertensive medications  - Reviewed recent eye exam and previous labs results  - Considered need for x-ray evaluation of shoulder due to history of total replacement surgery    DIABETES MELLITUS TYPE 2 WITHOUT RETINOPATHY:  - Ordered labs including A1c and fasting glucose test to be drawn today.  - Noted A1c increased from 6.7 to 7.9 in six months.  - Advised patient to tighten up diet due to this increase.  - Plan to monitor A1c and potentially intensify therapy if it continues to rise.  - Scheduled follow up in 4 months for diabetic checkup and A1c recheck.  - Cholesterol and thyroid levels from last year were within normal limits and holding steady.    HYPERTENSION:  - Measured blood pressure in office at 150/150, noting a slight decrease.  - Recent home readings were 136/72 and 138/68.  - Continued current medications: Bistolic, Olmesartan, Terazosin, HCTZ, and Amlodipine.  - Instructed patient to monitor blood pressure at home regularly and contact the office if consistently elevated.  - Scheduled follow up in 4 months for blood pressure checkup.    SHOULDER PAIN:  - Richy reports sudden onset arm pain without injury or trauma, worse at night and when lying on it.  - Physical exam revealed pain with certain shoulder movements, especially elevation and reaching out.  - Suspected rotator cuff tendinitis.  - Referred to orthopedist Dr. Duenas for evaluation and x-rays of the shoulder.    RIGHT SHOULDER  REPLACEMENT:  - Noted history of right shoulder replacement surgery in 2013 or 2014.  - Referred to Dr. Duenas for x-ray assessment of previous total shoulder replacement.    LEFT SHOULDER REPLACEMENT:  - Noted history of left shoulder replacement surgery in 2014 or 2015.  - Referred to Dr. Duenas for x-ray assessment of previous total shoulder replacement.    HYPERLIPIDEMIA:  - Ordered fasting labs for lipid panel assessment.    DIABETES:  - Discussed importance of diet control in managing diabetes, explaining potential impact of excessive carbohydrate intake on glucose levels.       Plan:       Left shoulder pain, unspecified chronicity  -     Ambulatory referral/consult to Orthopedics; Future; Expected date: 02/18/2025    Type 2 diabetes mellitus without complication, without long-term current use of insulin  -     POCT HEMOGLOBIN A1C    Essential hypertension    Hyperlipidemia, unspecified hyperlipidemia type    Hypothyroidism, unspecified type    Gastroesophageal reflux disease without esophagitis      Follow up in about 4 months (around 6/18/2025) for Diabetic Check-Up, BP Check-Up.    This note was generated with the assistance of ambient listening technology. Verbal consent was obtained by the patient and accompanying visitor(s) for the recording of patient appointment to facilitate this note. I attest to having reviewed and edited the generated note for accuracy, though some syntax or spelling errors may persist. Please contact the author of this note for any clarification.          2/18/2025 Adolfo Mae PA-C           [1]   Current Outpatient Medications:     allopurinoL (ZYLOPRIM) 300 MG tablet, Take 1 tablet (300 mg total) by mouth once daily., Disp: 90 tablet, Rfl: 1    amLODIPine (NORVASC) 10 MG tablet, Take 1 tablet (10 mg total) by mouth once daily., Disp: 90 tablet, Rfl: 1    atorvastatin (LIPITOR) 40 MG tablet, Take 1 tablet (40 mg total) by mouth once daily., Disp: 90 tablet, Rfl: 1     hydroCHLOROthiazide (HYDRODIURIL) 25 MG tablet, Take 1 tablet (25 mg total) by mouth once daily., Disp: 90 tablet, Rfl: 1    levothyroxine (SYNTHROID) 175 MCG tablet, Take 1 tablet (175 mcg total) by mouth once daily., Disp: 90 tablet, Rfl: 1    nateglinide (STARLIX) 60 MG tablet, Take 1 tablet (60 mg total) by mouth once daily., Disp: 90 tablet, Rfl: 3    nebivoloL (BYSTOLIC) 20 mg Tab, Take 20 mg by mouth once daily., Disp: 90 tablet, Rfl: 3    olmesartan (BENICAR) 40 MG tablet, Take 1 tablet (40 mg total) by mouth once daily., Disp: 90 tablet, Rfl: 1    omeprazole (PRILOSEC) 20 MG capsule, Take 1 capsule (20 mg total) by mouth once daily., Disp: 90 capsule, Rfl: 1    terazosin (HYTRIN) 5 MG capsule, Take 1 capsule (5 mg total) by mouth every evening., Disp: 90 capsule, Rfl: 1

## 2025-02-19 LAB
ALBUMIN SERPL-MCNC: 4.3 G/DL (ref 3.6–5.1)
ALBUMIN/CREAT UR: 7 MG/G CREAT
ALBUMIN/GLOB SERPL: 1.8 (CALC) (ref 1–2.5)
ALP SERPL-CCNC: 102 U/L (ref 35–144)
ALT SERPL-CCNC: 32 U/L (ref 9–46)
APPEARANCE UR: CLEAR
AST SERPL-CCNC: 24 U/L (ref 10–35)
BACTERIA #/AREA URNS HPF: NORMAL /HPF
BACTERIA UR CULT: NORMAL
BILIRUB SERPL-MCNC: 0.7 MG/DL (ref 0.2–1.2)
BILIRUB UR QL STRIP: NEGATIVE
BUN SERPL-MCNC: 20 MG/DL (ref 7–25)
BUN/CREAT SERPL: ABNORMAL (CALC) (ref 6–22)
CALCIUM SERPL-MCNC: 9.5 MG/DL (ref 8.6–10.3)
CHLORIDE SERPL-SCNC: 99 MMOL/L (ref 98–110)
CHOLEST SERPL-MCNC: 128 MG/DL
CHOLEST/HDLC SERPL: 2.8 (CALC)
CO2 SERPL-SCNC: 29 MMOL/L (ref 20–32)
COLOR UR: YELLOW
CREAT SERPL-MCNC: 1.1 MG/DL (ref 0.7–1.28)
CREAT UR-MCNC: 187 MG/DL (ref 20–320)
EGFR: 70 ML/MIN/1.73M2
GLOBULIN SER CALC-MCNC: 2.4 G/DL (CALC) (ref 1.9–3.7)
GLUCOSE SERPL-MCNC: 191 MG/DL (ref 65–99)
GLUCOSE UR QL STRIP: NEGATIVE
HBA1C MFR BLD: 8.5 % OF TOTAL HGB
HDLC SERPL-MCNC: 45 MG/DL
HGB UR QL STRIP: NEGATIVE
HYALINE CASTS #/AREA URNS LPF: NORMAL /LPF
KETONES UR QL STRIP: NEGATIVE
LDLC SERPL CALC-MCNC: 58 MG/DL (CALC)
LEUKOCYTE ESTERASE UR QL STRIP: NEGATIVE
MICROALBUMIN UR-MCNC: 1.4 MG/DL
NITRITE UR QL STRIP: NEGATIVE
NONHDLC SERPL-MCNC: 83 MG/DL (CALC)
PH UR STRIP: 6 [PH] (ref 5–8)
POTASSIUM SERPL-SCNC: 4.7 MMOL/L (ref 3.5–5.3)
PROT SERPL-MCNC: 6.7 G/DL (ref 6.1–8.1)
PROT UR QL STRIP: NEGATIVE
PSA SERPL-MCNC: 2.01 NG/ML
RBC #/AREA URNS HPF: NORMAL /HPF
SERVICE CMNT-IMP: NORMAL
SODIUM SERPL-SCNC: 139 MMOL/L (ref 135–146)
SP GR UR STRIP: 1.02 (ref 1–1.03)
SQUAMOUS #/AREA URNS HPF: NORMAL /HPF
TRIGL SERPL-MCNC: 177 MG/DL
WBC #/AREA URNS HPF: NORMAL /HPF

## 2025-04-07 DIAGNOSIS — E03.9 HYPOTHYROIDISM, UNSPECIFIED TYPE: ICD-10-CM

## 2025-04-07 DIAGNOSIS — I10 ESSENTIAL HYPERTENSION: ICD-10-CM

## 2025-04-07 RX ORDER — NEBIVOLOL 20 MG/1
20 TABLET ORAL DAILY
Qty: 90 TABLET | Refills: 3 | Status: SHIPPED | OUTPATIENT
Start: 2025-04-07 | End: 2026-04-07

## 2025-04-07 RX ORDER — LEVOTHYROXINE SODIUM 175 UG/1
175 TABLET ORAL DAILY
Qty: 90 TABLET | Refills: 3 | Status: SHIPPED | OUTPATIENT
Start: 2025-04-07 | End: 2026-04-02

## 2025-04-07 NOTE — TELEPHONE ENCOUNTER
----- Message from Marina sent at 4/7/2025  8:36 AM CDT -----  Patient would like to refill his nebivolol, levothyroxine. Patient need to check to see if he has any other medications to refill. [phone]

## 2025-04-15 DIAGNOSIS — E03.9 HYPOTHYROIDISM, UNSPECIFIED TYPE: ICD-10-CM

## 2025-04-15 RX ORDER — LEVOTHYROXINE SODIUM 175 UG/1
175 TABLET ORAL DAILY
Qty: 90 TABLET | Refills: 3 | Status: SHIPPED | OUTPATIENT
Start: 2025-04-15 | End: 2026-04-10

## 2025-04-15 NOTE — TELEPHONE ENCOUNTER
----- Message from Chanell sent at 4/15/2025  8:54 AM CDT -----  Pt's levothyroxine needs to go to [facility] and not [facility]. [phone]

## 2025-04-28 DIAGNOSIS — Z00.00 ENCOUNTER FOR MEDICARE ANNUAL WELLNESS EXAM: ICD-10-CM

## 2025-05-02 ENCOUNTER — TELEPHONE (OUTPATIENT)
Dept: FAMILY MEDICINE | Facility: CLINIC | Age: 75
End: 2025-05-02
Payer: MEDICARE

## 2025-05-02 NOTE — TELEPHONE ENCOUNTER
----- Message from Dottie sent at 5/2/2025  9:55 AM CDT -----  Pt needs refill on 90 day levothyroxine [facility] [phone]

## 2025-05-02 NOTE — TELEPHONE ENCOUNTER
Disp Refills Start End INGE   levothyroxine (SYNTHROID) 175 MCG tablet 90 tablet 3 4/15/2025 4/10/2026 No   Sig - Route: Take 1 tablet (175 mcg total) by mouth once daily. - Oral   Sent to pharmacy as: levothyroxine (SYNTHROID) 175 MCG tablet   Class: Normal   Order: [id number]   Date/Time Signed: 4/15/2025 09:09       E-Prescribing Status: Receipt confirmed by pharmacy (4/15/2025  9:09 AM CDT)     Notified pt already sent

## 2025-06-16 ENCOUNTER — TELEPHONE (OUTPATIENT)
Dept: FAMILY MEDICINE | Facility: CLINIC | Age: 75
End: 2025-06-16
Payer: MEDICARE

## 2025-06-16 NOTE — TELEPHONE ENCOUNTER
----- Message from Chanell sent at 6/16/2025 11:57 AM CDT -----  Pt cancelled his appointment for tomorrow, however, he wanted to reschedule appointment for 06/24.    [phone]

## 2025-06-24 ENCOUNTER — TELEPHONE (OUTPATIENT)
Dept: FAMILY MEDICINE | Facility: CLINIC | Age: 75
End: 2025-06-24
Payer: MEDICARE

## 2025-06-24 NOTE — TELEPHONE ENCOUNTER
----- Message from Ranjith Kaplan sent at 6/24/2025  9:54 AM CDT -----    ----- Message -----  From: Marina Black  Sent: 6/24/2025   9:44 AM CDT  To: Kenneth Wyatt Staff    Patient calling in regarding to his appointment that he had for last week and canceled due to going out of town that he would like to reschedule. Scheduled patient with Nathen on 08/18. However, patient would like for an sooner appointment if available.   [phone]

## 2025-07-14 DIAGNOSIS — K21.9 GASTROESOPHAGEAL REFLUX DISEASE WITHOUT ESOPHAGITIS: ICD-10-CM

## 2025-07-14 DIAGNOSIS — I10 ESSENTIAL HYPERTENSION: ICD-10-CM

## 2025-07-14 DIAGNOSIS — M10.09 IDIOPATHIC GOUT OF MULTIPLE SITES, UNSPECIFIED CHRONICITY: ICD-10-CM

## 2025-07-14 DIAGNOSIS — E78.5 HYPERLIPIDEMIA, UNSPECIFIED HYPERLIPIDEMIA TYPE: ICD-10-CM

## 2025-07-14 RX ORDER — ATORVASTATIN CALCIUM 40 MG/1
40 TABLET, FILM COATED ORAL DAILY
Qty: 90 TABLET | Refills: 1 | Status: SHIPPED | OUTPATIENT
Start: 2025-07-14 | End: 2026-01-10

## 2025-07-14 RX ORDER — AMLODIPINE BESYLATE 10 MG/1
10 TABLET ORAL DAILY
Qty: 90 TABLET | Refills: 1 | Status: SHIPPED | OUTPATIENT
Start: 2025-07-14 | End: 2026-01-10

## 2025-07-14 RX ORDER — OLMESARTAN MEDOXOMIL 40 MG/1
40 TABLET ORAL DAILY
Qty: 90 TABLET | Refills: 1 | Status: SHIPPED | OUTPATIENT
Start: 2025-07-14

## 2025-07-14 RX ORDER — HYDROCHLOROTHIAZIDE 25 MG/1
25 TABLET ORAL DAILY
Qty: 90 TABLET | Refills: 1 | Status: SHIPPED | OUTPATIENT
Start: 2025-07-14 | End: 2026-01-10

## 2025-07-14 RX ORDER — OMEPRAZOLE 20 MG/1
20 CAPSULE, DELAYED RELEASE ORAL DAILY
Qty: 90 CAPSULE | Refills: 1 | Status: SHIPPED | OUTPATIENT
Start: 2025-07-14

## 2025-07-14 RX ORDER — ALLOPURINOL 300 MG/1
300 TABLET ORAL DAILY
Qty: 90 TABLET | Refills: 1 | Status: SHIPPED | OUTPATIENT
Start: 2025-07-14

## 2025-07-14 RX ORDER — TERAZOSIN 5 MG/1
5 CAPSULE ORAL NIGHTLY
Qty: 90 CAPSULE | Refills: 1 | Status: SHIPPED | OUTPATIENT
Start: 2025-07-14

## 2025-07-14 NOTE — TELEPHONE ENCOUNTER
----- Message from Dottie sent at 7/14/2025  7:48 AM CDT -----  - 7/11-1:45 pm-  pt needs refill on all of his medications except 2   [phone]

## 2025-07-22 ENCOUNTER — OFFICE VISIT (OUTPATIENT)
Dept: FAMILY MEDICINE | Facility: CLINIC | Age: 75
End: 2025-07-22
Payer: MEDICARE

## 2025-07-22 VITALS
HEIGHT: 71 IN | OXYGEN SATURATION: 95 % | BODY MASS INDEX: 32.62 KG/M2 | HEART RATE: 59 BPM | SYSTOLIC BLOOD PRESSURE: 132 MMHG | DIASTOLIC BLOOD PRESSURE: 60 MMHG | WEIGHT: 233 LBS

## 2025-07-22 DIAGNOSIS — I10 ESSENTIAL HYPERTENSION: ICD-10-CM

## 2025-07-22 DIAGNOSIS — E11.9 TYPE 2 DIABETES MELLITUS WITHOUT COMPLICATION, WITHOUT LONG-TERM CURRENT USE OF INSULIN: Primary | ICD-10-CM

## 2025-07-22 DIAGNOSIS — N18.31 CHRONIC KIDNEY DISEASE, STAGE 3A: ICD-10-CM

## 2025-07-22 DIAGNOSIS — E11.36 TYPE 2 DIABETES MELLITUS WITH DIABETIC CATARACT, WITHOUT LONG-TERM CURRENT USE OF INSULIN: ICD-10-CM

## 2025-07-22 DIAGNOSIS — K21.9 GASTROESOPHAGEAL REFLUX DISEASE WITHOUT ESOPHAGITIS: ICD-10-CM

## 2025-07-22 DIAGNOSIS — E03.9 HYPOTHYROIDISM, UNSPECIFIED TYPE: ICD-10-CM

## 2025-07-22 LAB — HBA1C MFR BLD: 8.3 %

## 2025-07-22 PROCEDURE — 83036 HEMOGLOBIN GLYCOSYLATED A1C: CPT | Mod: QW,,, | Performed by: PHYSICIAN ASSISTANT

## 2025-07-22 PROCEDURE — 3075F SYST BP GE 130 - 139MM HG: CPT | Mod: CPTII,S$GLB,, | Performed by: PHYSICIAN ASSISTANT

## 2025-07-22 PROCEDURE — 99214 OFFICE O/P EST MOD 30 MIN: CPT | Mod: S$GLB,,, | Performed by: PHYSICIAN ASSISTANT

## 2025-07-22 PROCEDURE — 1101F PT FALLS ASSESS-DOCD LE1/YR: CPT | Mod: CPTII,S$GLB,, | Performed by: PHYSICIAN ASSISTANT

## 2025-07-22 PROCEDURE — 3078F DIAST BP <80 MM HG: CPT | Mod: CPTII,S$GLB,, | Performed by: PHYSICIAN ASSISTANT

## 2025-07-22 PROCEDURE — 1159F MED LIST DOCD IN RCRD: CPT | Mod: CPTII,S$GLB,, | Performed by: PHYSICIAN ASSISTANT

## 2025-07-22 PROCEDURE — 3288F FALL RISK ASSESSMENT DOCD: CPT | Mod: CPTII,S$GLB,, | Performed by: PHYSICIAN ASSISTANT

## 2025-07-22 RX ORDER — TIRZEPATIDE 2.5 MG/.5ML
2.5 INJECTION, SOLUTION SUBCUTANEOUS
Qty: 2 ML | Refills: 2 | Status: SHIPPED | OUTPATIENT
Start: 2025-07-22 | End: 2025-10-20

## 2025-07-22 NOTE — PROGRESS NOTES
SUBJECTIVE:    Patient ID: Richy Hdz is a 74 y.o. male.    Chief Complaint: 6 mth check up and discuss medications    History of Present Illness    CHIEF COMPLAINT:  Richy presents today for follow up.    MUSCULOSKELETAL:  He reports persistent left shoulder pain for approximately 6 months with sensation of arm swelling and discomfort in multiple areas. He describes the arm feeling tight, though no visible swelling is apparent. Symptoms continue without significant improvement.    RHINITIS:  He reports significant morning nasal congestion with excessive mucus production, using approximately 30 tissues during breakfast hour. Symptoms spontaneously improve throughout the day with occasional mild nasal dripping that resolves within a few hours. No nasal congestion symptoms occur after morning period. He denies interest in using decongestant medications like Afrin. His wife experiences similar symptoms.    DIABETES:  His A1c has increased from 7.9 to 8.3, indicating suboptimal diabetes management. He takes Nateglinide only sporadically, typically not more than 3 consecutive days. He expresses significant concern about starting metformin, citing fears about potential adverse effects on kidneys, liver, and brain based on anecdotal information. He demonstrates limited understanding of medication importance.    MEDICATIONS:  He takes blood pressure medications daily in the morning and Lipitor 10 mg at night. He acknowledges inconsistent medication adherence, taking medications when at home and using phone alarms to improve compliance, but sometimes misses doses when away from home or tired.      ROS:  General: -fever, -chills, -fatigue, -weight gain, -weight loss  Eyes: -vision changes, -redness, -discharge  ENT: -ear pain, +nasal congestion, -sore throat, +nasal discharge, +post nasal drip  Cardiovascular: -chest pain, -palpitations, -lower extremity edema  Respiratory: -cough, -shortness of  breath  Gastrointestinal: -abdominal pain, -nausea, -vomiting, -diarrhea, -constipation, -blood in stool  Genitourinary: -dysuria, -hematuria, -frequency  Musculoskeletal: -joint pain, -muscle pain, +limb pain  Skin: -rash, -lesion  Neurological: -headache, -dizziness, -numbness, -tingling  Psychiatric: -anxiety, -depression, -sleep difficulty         Office Visit on 07/22/2025   Component Date Value Ref Range Status    Hemoglobin A1C, POC 07/22/2025 8.3  % Final   Office Visit on 02/18/2025   Component Date Value Ref Range Status    Hemoglobin A1C, POC 02/18/2025 7.9  % Final   Telephone on 02/17/2025   Component Date Value Ref Range Status    Creatinine, Urine 02/18/2025 187  20 - 320 mg/dL Final    Microalb, Ur 02/18/2025 1.4  See Note: mg/dL Final    Microalb/Creat Ratio 02/18/2025 7  <30 mg/g creat Final    Cholesterol 02/18/2025 128  <200 mg/dL Final    HDL 02/18/2025 45  > OR = 40 mg/dL Final    Triglycerides 02/18/2025 177 (H)  <150 mg/dL Final    LDL Cholesterol 02/18/2025 58  mg/dL (calc) Final    HDL/Cholesterol Ratio 02/18/2025 2.8  <5.0 (calc) Final    Non HDL Chol. (LDL+VLDL) 02/18/2025 83  <130 mg/dL (calc) Final    Glucose 02/18/2025 191 (H)  65 - 99 mg/dL Final    BUN 02/18/2025 20  7 - 25 mg/dL Final    Creatinine 02/18/2025 1.10  0.70 - 1.28 mg/dL Final    eGFR 02/18/2025 70  > OR = 60 mL/min/1.73m2 Final    BUN/Creatinine Ratio 02/18/2025 SEE NOTE:  6 - 22 (calc) Final    Sodium 02/18/2025 139  135 - 146 mmol/L Final    Potassium 02/18/2025 4.7  3.5 - 5.3 mmol/L Final    Chloride 02/18/2025 99  98 - 110 mmol/L Final    CO2 02/18/2025 29  20 - 32 mmol/L Final    Calcium 02/18/2025 9.5  8.6 - 10.3 mg/dL Final    Total Protein 02/18/2025 6.7  6.1 - 8.1 g/dL Final    Albumin 02/18/2025 4.3  3.6 - 5.1 g/dL Final    Globulin, Total 02/18/2025 2.4  1.9 - 3.7 g/dL (calc) Final    Albumin/Globulin Ratio 02/18/2025 1.8  1.0 - 2.5 (calc) Final    Total Bilirubin 02/18/2025 0.7  0.2 - 1.2 mg/dL Final     Alkaline Phosphatase 02/18/2025 102  35 - 144 U/L Final    AST 02/18/2025 24  10 - 35 U/L Final    ALT 02/18/2025 32  9 - 46 U/L Final    Hemoglobin A1C 02/18/2025 8.5 (H)  <5.7 % of total Hgb Final    Color, UA 02/18/2025 YELLOW  YELLOW Final    Appearance, UA 02/18/2025 CLEAR  CLEAR Final    Specific Gravity, UA 02/18/2025 1.020  1.001 - 1.035 Final    pH, UA 02/18/2025 6.0  5.0 - 8.0 Final    Glucose, UA 02/18/2025 NEGATIVE  NEGATIVE Final    Bilirubin, UA 02/18/2025 NEGATIVE  NEGATIVE Final    Ketones, UA 02/18/2025 NEGATIVE  NEGATIVE Final    Occult Blood UA 02/18/2025 NEGATIVE  NEGATIVE Final    Protein, UA 02/18/2025 NEGATIVE  NEGATIVE Final    Nitrite, UA 02/18/2025 NEGATIVE  NEGATIVE Final    Leukocytes, UA 02/18/2025 NEGATIVE  NEGATIVE Final    WBC Casts, UA 02/18/2025 NONE SEEN  < OR = 5 /HPF Final    RBC Casts, UA 02/18/2025 NONE SEEN  < OR = 2 /HPF Final    Squam Epithel, UA 02/18/2025 0-5  < OR = 5 /HPF Final    Bacteria, UA 02/18/2025 NONE SEEN  NONE SEEN /HPF Final    Hyaline Casts, UA 02/18/2025 NONE SEEN  NONE SEEN /LPF Final    Service Cmt: 02/18/2025 SEE COMMENT   Final    Reflexive Urine Culture 02/18/2025 SEE COMMENT   Final    PROSTATE SPECIFIC ANTIGEN, SCR - Q* 02/18/2025 2.01  < OR = 4.00 ng/mL Final       Past Medical History:   Diagnosis Date    Back pain     Cancer     skin-ear    Gout attack     Hyperlipemia     Hypertension     Thyroid disease      Past Surgical History:   Procedure Laterality Date    JOINT REPLACEMENT      bilateral shoulder    kidney stones      SKIN CANCER EXCISION  2013    x 2 to ear.    TONSILLECTOMY      VASECTOMY       Family History   Problem Relation Name Age of Onset    Glaucoma Neg Hx      Macular degeneration Neg Hx         Marital Status:   Alcohol History:  reports current alcohol use of about 7.0 standard drinks of alcohol per week.  Tobacco History:  reports that he has quit smoking. His smoking use included cigars. He has never used smokeless  "tobacco.  Drug History:  has no history on file for drug use.    Review of patient's allergies indicates:  No Known Allergies  Current Medications[1]    Objective:      Vitals:    07/22/25 1510   BP: 132/60   Pulse: (!) 59   SpO2: 95%   Weight: 105.7 kg (233 lb)   Height: 5' 11" (1.803 m)     Physical Exam    Vitals: Blood pressure: 132/60.  General: No acute distress. Well-developed. Well-nourished.  Eyes: EOMI. Sclerae anicteric.  HENT: Normocephalic. Atraumatic. Nares patent. Moist oral mucosa.  Ears: Bilateral TMs clear. Bilateral EACs clear.  Cardiovascular: Regular rate. Regular rhythm. No murmurs. No rubs. No gallops. Normal S1, S2.  Respiratory: Normal respiratory effort. Clear to auscultation bilaterally. No rales. No rhonchi. No wheezing.  Abdomen: Soft. Non-tender. Non-distended. Normoactive bowel sounds.  Musculoskeletal: No  obvious deformity.  Extremities: No lower extremity edema.  Neurological: Alert & oriented x3. No slurred speech. Normal gait.  Psychiatric: Normal mood. Normal affect. Good insight. Good judgment.  Skin: Warm. Dry. No rash.         Assessment:       Assessment & Plan    - A1C increased to 8.3 from previous 7.9.  - Ruled out Jardiance due to insurance coverage issues.  - Initiated Mounjaro (tirzepatide) 2.5 mg subcutaneous injection weekly for improved glycemic control, with intention to increase to 5 mg after 1-2 months for maintenance.    TYPE 2 DIABETES MELLITUS WITH DIABETIC CATARACT:  - Monitored patient's A1c, which has increased from 7.9 to 8.3, indicating worsening glycemic control.  - After evaluating current medication regimen (noting previous Metformin issues and current Nateglinide use) and assessing that Jardiance is cost-prohibitive, initiated Mounjaro (tirzepatide) 2.5 mg subcutaneous injection weekly.  - Plan to increase to 5 mg after 1-2 months for maintenance.  - Educated patient on proper administration of the auto-injector, rotation of injection sites between " stomach, glutes, and legs, and flexibility in weekly dosing schedule.  - Prescription sent to [facility] on the [facility].  - Instructed patient to contact office if there are issues obtaining Mounjaro or if it's unexpectedly expensive.  - Ordered A1C test and scheduled follow up in 3 months to assess treatment effectiveness and response to Mounjaro.    LEFT SHOULDER PAIN:  - Monitored the patient's left shoulder pain and swelling, which feels tight and uncomfortable.  - Noted complaints consistent with previous visits.    ALLERGIC RHINITIS:  - Monitored morning nasal congestion requiring approximately 30 tissues, followed by occasional rhinorrhea throughout the day.  - Recent allergy season has been severe due to delayed pollination.  - Prescribed Flonase nasal spray, 2 sprays in each nostril daily, and recommended daily antihistamine (preferably Xyzal) for consistent symptom control.    HYPERLIPIDEMIA:  - Monitored adherence to Lipitor for cholesterol management.  - Will continue Lipitor to be taken at night.    ESSENTIAL HYPERTENSION:  - Blood pressure is well-controlled at 132/60 today.  - Will continue current antihypertensive medication to be taken daily.    MEDICATION UNDERDOSING AND DIABETES MANAGEMENT:  - Richy has shown inconsistent adherence to Nateglinide despite attempts to improve compliance by setting alarms.  - This has contributed to the worsening glycemic control.       Plan:       Type 2 diabetes mellitus without complication, without long-term current use of insulin  -     tirzepatide (MOUNJARO) 2.5 mg/0.5 mL PnIj; Inject 2.5 mg into the skin every 7 days.  Dispense: 2 mL; Refill: 2  -     POCT HEMOGLOBIN A1C    Essential hypertension    Gastroesophageal reflux disease without esophagitis    Hypothyroidism, unspecified type    Type 2 diabetes mellitus with diabetic cataract, without long-term current use of insulin    Chronic kidney disease, stage 3a      Follow up in about 3 months (around  10/22/2025) for Diabetic Check-Up.    This note was generated with the assistance of ambient listening technology. Verbal consent was obtained by the patient and accompanying visitor(s) for the recording of patient appointment to facilitate this note. I attest to having reviewed and edited the generated note for accuracy, though some syntax or spelling errors may persist. Please contact the author of this note for any clarification.          7/22/2025 Adolfo Mae PA-C           [1]   Current Outpatient Medications:     allopurinoL (ZYLOPRIM) 300 MG tablet, Take 1 tablet (300 mg total) by mouth once daily., Disp: 90 tablet, Rfl: 1    amLODIPine (NORVASC) 10 MG tablet, Take 1 tablet (10 mg total) by mouth once daily., Disp: 90 tablet, Rfl: 1    atorvastatin (LIPITOR) 40 MG tablet, Take 1 tablet (40 mg total) by mouth once daily., Disp: 90 tablet, Rfl: 1    hydroCHLOROthiazide (HYDRODIURIL) 25 MG tablet, Take 1 tablet (25 mg total) by mouth once daily., Disp: 90 tablet, Rfl: 1    levothyroxine (SYNTHROID) 175 MCG tablet, Take 1 tablet (175 mcg total) by mouth once daily., Disp: 90 tablet, Rfl: 3    nateglinide (STARLIX) 60 MG tablet, Take 1 tablet (60 mg total) by mouth once daily., Disp: 90 tablet, Rfl: 3    nebivoloL (BYSTOLIC) 20 mg Tab, Take 1 tablet (20 mg total) by mouth once daily., Disp: 90 tablet, Rfl: 3    olmesartan (BENICAR) 40 MG tablet, Take 1 tablet (40 mg total) by mouth once daily., Disp: 90 tablet, Rfl: 1    omeprazole (PRILOSEC) 20 MG capsule, Take 1 capsule (20 mg total) by mouth once daily., Disp: 90 capsule, Rfl: 1    terazosin (HYTRIN) 5 MG capsule, Take 1 capsule (5 mg total) by mouth every evening., Disp: 90 capsule, Rfl: 1    tirzepatide (MOUNJARO) 2.5 mg/0.5 mL PnIj, Inject 2.5 mg into the skin every 7 days., Disp: 2 mL, Rfl: 2